# Patient Record
Sex: MALE | Race: WHITE | NOT HISPANIC OR LATINO | Employment: OTHER | URBAN - METROPOLITAN AREA
[De-identification: names, ages, dates, MRNs, and addresses within clinical notes are randomized per-mention and may not be internally consistent; named-entity substitution may affect disease eponyms.]

---

## 2017-03-03 ENCOUNTER — ALLSCRIPTS OFFICE VISIT (OUTPATIENT)
Dept: OTHER | Facility: OTHER | Age: 57
End: 2017-03-03

## 2017-03-30 ENCOUNTER — ALLSCRIPTS OFFICE VISIT (OUTPATIENT)
Dept: OTHER | Facility: OTHER | Age: 57
End: 2017-03-30

## 2017-03-30 DIAGNOSIS — K40.90 UNILATERAL INGUINAL HERNIA WITHOUT OBSTRUCTION OR GANGRENE: ICD-10-CM

## 2017-03-31 ENCOUNTER — HOSPITAL ENCOUNTER (OUTPATIENT)
Dept: RADIOLOGY | Facility: HOSPITAL | Age: 57
Discharge: HOME | End: 2017-03-31
Attending: SURGERY
Payer: COMMERCIAL

## 2017-03-31 DIAGNOSIS — K40.90 UNILATERAL INGUINAL HERNIA WITHOUT OBSTRUCTION OR GANGRENE: ICD-10-CM

## 2017-03-31 PROCEDURE — 76705 ECHO EXAM OF ABDOMEN: CPT

## 2017-04-11 ENCOUNTER — HOSPITAL ENCOUNTER (OUTPATIENT)
Dept: RADIOLOGY | Age: 57
Discharge: HOME | End: 2017-04-11
Payer: COMMERCIAL

## 2017-04-11 ENCOUNTER — APPOINTMENT (OUTPATIENT)
Dept: LAB | Age: 57
End: 2017-04-11
Payer: COMMERCIAL

## 2017-04-11 ENCOUNTER — TRANSCRIBE ORDERS (OUTPATIENT)
Dept: ADMINISTRATIVE | Age: 57
End: 2017-04-11

## 2017-04-11 DIAGNOSIS — N40.0 BENIGN PROSTATIC HYPERPLASIA, PRESENCE OF LOWER URINARY TRACT SYMPTOMS UNSPECIFIED, UNSPECIFIED MORPHOLOGY: ICD-10-CM

## 2017-04-11 DIAGNOSIS — E78.5 OTHER AND UNSPECIFIED HYPERLIPIDEMIA: ICD-10-CM

## 2017-04-11 DIAGNOSIS — I25.10 ATHEROSCLEROSIS OF NATIVE CORONARY ARTERY, ANGINA PRESENCE UNSPECIFIED, UNSPECIFIED WHETHER NATIVE OR TRANSPLANTED HEART: ICD-10-CM

## 2017-04-11 DIAGNOSIS — R52 PAIN: ICD-10-CM

## 2017-04-11 DIAGNOSIS — I25.10 ATHEROSCLEROSIS OF NATIVE CORONARY ARTERY, ANGINA PRESENCE UNSPECIFIED, UNSPECIFIED WHETHER NATIVE OR TRANSPLANTED HEART: Primary | ICD-10-CM

## 2017-04-11 LAB
ALBUMIN SERPL BCP-MCNC: 3.4 G/DL (ref 3.5–5)
ALP SERPL-CCNC: 125 U/L (ref 46–116)
ALT SERPL W P-5'-P-CCNC: 29 U/L (ref 12–78)
ANION GAP SERPL CALCULATED.3IONS-SCNC: 7 MMOL/L (ref 4–13)
AST SERPL W P-5'-P-CCNC: 19 U/L (ref 5–45)
BACTERIA UR QL AUTO: NORMAL /HPF
BASOPHILS # BLD AUTO: 0.04 THOUSANDS/ΜL (ref 0–0.1)
BASOPHILS NFR BLD AUTO: 1 % (ref 0–1)
BILIRUB SERPL-MCNC: 0.86 MG/DL (ref 0.2–1)
BILIRUB UR QL STRIP: NEGATIVE
BUN SERPL-MCNC: 16 MG/DL (ref 5–25)
CALCIUM SERPL-MCNC: 9.2 MG/DL (ref 8.3–10.1)
CHLORIDE SERPL-SCNC: 105 MMOL/L (ref 100–108)
CHOLEST SERPL-MCNC: 135 MG/DL (ref 50–200)
CLARITY UR: NORMAL
CO2 SERPL-SCNC: 29 MMOL/L (ref 21–32)
COLOR UR: YELLOW
CREAT SERPL-MCNC: 0.97 MG/DL (ref 0.6–1.3)
EOSINOPHIL # BLD AUTO: 0.52 THOUSAND/ΜL (ref 0–0.61)
EOSINOPHIL NFR BLD AUTO: 6 % (ref 0–6)
ERYTHROCYTE [DISTWIDTH] IN BLOOD BY AUTOMATED COUNT: 14.8 % (ref 11.6–15.1)
GFR SERPL CREATININE-BSD FRML MDRD: >60 ML/MIN/1.73SQ M
GLUCOSE P FAST SERPL-MCNC: 109 MG/DL (ref 65–99)
GLUCOSE UR STRIP-MCNC: NEGATIVE MG/DL
HCT VFR BLD AUTO: 42.8 % (ref 36.5–49.3)
HDLC SERPL-MCNC: 40 MG/DL (ref 40–60)
HGB BLD-MCNC: 14.4 G/DL (ref 12–17)
HGB UR QL STRIP.AUTO: NEGATIVE
HYALINE CASTS #/AREA URNS LPF: NORMAL /LPF
KETONES UR STRIP-MCNC: NEGATIVE MG/DL
LDLC SERPL CALC-MCNC: 66 MG/DL (ref 0–100)
LDLC SERPL DIRECT ASSAY-MCNC: 76 MG/DL (ref 0–100)
LEUKOCYTE ESTERASE UR QL STRIP: NEGATIVE
LYMPHOCYTES # BLD AUTO: 1.78 THOUSANDS/ΜL (ref 0.6–4.47)
LYMPHOCYTES NFR BLD AUTO: 21 % (ref 14–44)
MCH RBC QN AUTO: 33 PG (ref 26.8–34.3)
MCHC RBC AUTO-ENTMCNC: 33.6 G/DL (ref 31.4–37.4)
MCV RBC AUTO: 98 FL (ref 82–98)
MONOCYTES # BLD AUTO: 1.15 THOUSAND/ΜL (ref 0.17–1.22)
MONOCYTES NFR BLD AUTO: 14 % (ref 4–12)
NEUTROPHILS # BLD AUTO: 4.93 THOUSANDS/ΜL (ref 1.85–7.62)
NEUTS SEG NFR BLD AUTO: 58 % (ref 43–75)
NITRITE UR QL STRIP: NEGATIVE
NON-SQ EPI CELLS URNS QL MICRO: NORMAL /HPF
NRBC BLD AUTO-RTO: 0 /100 WBCS
PH UR STRIP.AUTO: 6 [PH] (ref 4.5–8)
PLATELET # BLD AUTO: 227 THOUSANDS/UL (ref 149–390)
PMV BLD AUTO: 11.8 FL (ref 8.9–12.7)
POTASSIUM SERPL-SCNC: 4.1 MMOL/L (ref 3.5–5.3)
PROT SERPL-MCNC: 7.5 G/DL (ref 6.4–8.2)
PROT UR STRIP-MCNC: NEGATIVE MG/DL
PSA SERPL-MCNC: 1.5 NG/ML (ref 0–4)
RBC # BLD AUTO: 4.36 MILLION/UL (ref 3.88–5.62)
RBC #/AREA URNS AUTO: NORMAL /HPF
SODIUM SERPL-SCNC: 141 MMOL/L (ref 136–145)
SP GR UR STRIP.AUTO: 1.01 (ref 1–1.03)
TRIGL SERPL-MCNC: 146 MG/DL
UROBILINOGEN UR QL STRIP.AUTO: 0.2 E.U./DL
WBC # BLD AUTO: 8.45 THOUSAND/UL (ref 4.31–10.16)
WBC #/AREA URNS AUTO: NORMAL /HPF

## 2017-04-11 PROCEDURE — 85025 COMPLETE CBC W/AUTO DIFF WBC: CPT

## 2017-04-11 PROCEDURE — 81001 URINALYSIS AUTO W/SCOPE: CPT | Performed by: INTERNAL MEDICINE

## 2017-04-11 PROCEDURE — 73502 X-RAY EXAM HIP UNI 2-3 VIEWS: CPT

## 2017-04-11 PROCEDURE — 80053 COMPREHEN METABOLIC PANEL: CPT

## 2017-04-11 PROCEDURE — G0103 PSA SCREENING: HCPCS

## 2017-04-11 PROCEDURE — 80061 LIPID PANEL: CPT

## 2017-04-11 PROCEDURE — 83721 ASSAY OF BLOOD LIPOPROTEIN: CPT

## 2017-04-11 PROCEDURE — 36415 COLL VENOUS BLD VENIPUNCTURE: CPT

## 2017-04-13 ENCOUNTER — ALLSCRIPTS OFFICE VISIT (OUTPATIENT)
Dept: OTHER | Facility: OTHER | Age: 57
End: 2017-04-13

## 2017-07-21 ENCOUNTER — ALLSCRIPTS OFFICE VISIT (OUTPATIENT)
Dept: OTHER | Facility: OTHER | Age: 57
End: 2017-07-21

## 2017-11-22 ENCOUNTER — APPOINTMENT (OUTPATIENT)
Dept: LAB | Age: 57
End: 2017-11-22
Payer: COMMERCIAL

## 2017-11-22 ENCOUNTER — TRANSCRIBE ORDERS (OUTPATIENT)
Dept: ADMINISTRATIVE | Age: 57
End: 2017-11-22

## 2017-11-22 DIAGNOSIS — E11.9 TYPE 2 DIABETES MELLITUS WITHOUT COMPLICATIONS (HCC): ICD-10-CM

## 2017-11-22 DIAGNOSIS — Z12.5 ENCOUNTER FOR SCREENING FOR MALIGNANT NEOPLASM OF PROSTATE: ICD-10-CM

## 2017-11-22 LAB
ALBUMIN SERPL BCP-MCNC: 3.5 G/DL (ref 3.5–5)
ALP SERPL-CCNC: 135 U/L (ref 46–116)
ALT SERPL W P-5'-P-CCNC: 27 U/L (ref 12–78)
ANION GAP SERPL CALCULATED.3IONS-SCNC: 5 MMOL/L (ref 4–13)
AST SERPL W P-5'-P-CCNC: 19 U/L (ref 5–45)
BILIRUB SERPL-MCNC: 0.96 MG/DL (ref 0.2–1)
BUN SERPL-MCNC: 20 MG/DL (ref 5–25)
CALCIUM SERPL-MCNC: 9.1 MG/DL (ref 8.3–10.1)
CHLORIDE SERPL-SCNC: 104 MMOL/L (ref 100–108)
CHOLEST SERPL-MCNC: 96 MG/DL (ref 50–200)
CO2 SERPL-SCNC: 26 MMOL/L (ref 21–32)
CREAT SERPL-MCNC: 0.95 MG/DL (ref 0.6–1.3)
ERYTHROCYTE [DISTWIDTH] IN BLOOD BY AUTOMATED COUNT: 13.4 % (ref 11.6–15.1)
EST. AVERAGE GLUCOSE BLD GHB EST-MCNC: 126 MG/DL
GFR SERPL CREATININE-BSD FRML MDRD: 87 ML/MIN/1.73SQ M
GLUCOSE P FAST SERPL-MCNC: 102 MG/DL (ref 65–99)
HBA1C MFR BLD: 6 % (ref 4.2–6.3)
HCT VFR BLD AUTO: 42.2 % (ref 36.5–49.3)
HDLC SERPL-MCNC: 36 MG/DL (ref 40–60)
HGB BLD-MCNC: 14.3 G/DL (ref 12–17)
LDLC SERPL CALC-MCNC: 36 MG/DL (ref 0–100)
MCH RBC QN AUTO: 32.6 PG (ref 26.8–34.3)
MCHC RBC AUTO-ENTMCNC: 33.9 G/DL (ref 31.4–37.4)
MCV RBC AUTO: 96 FL (ref 82–98)
PLATELET # BLD AUTO: 250 THOUSANDS/UL (ref 149–390)
PMV BLD AUTO: 11.3 FL (ref 8.9–12.7)
POTASSIUM SERPL-SCNC: 4.5 MMOL/L (ref 3.5–5.3)
PROT SERPL-MCNC: 7.6 G/DL (ref 6.4–8.2)
PSA SERPL-MCNC: 1.3 NG/ML (ref 0–4)
RBC # BLD AUTO: 4.38 MILLION/UL (ref 3.88–5.62)
SODIUM SERPL-SCNC: 135 MMOL/L (ref 136–145)
TRIGL SERPL-MCNC: 119 MG/DL
TSH SERPL DL<=0.05 MIU/L-ACNC: 1.64 UIU/ML (ref 0.36–3.74)
WBC # BLD AUTO: 8.17 THOUSAND/UL (ref 4.31–10.16)

## 2017-11-22 PROCEDURE — 83036 HEMOGLOBIN GLYCOSYLATED A1C: CPT

## 2017-11-22 PROCEDURE — G0103 PSA SCREENING: HCPCS

## 2017-11-22 PROCEDURE — 80061 LIPID PANEL: CPT

## 2017-11-22 PROCEDURE — 85027 COMPLETE CBC AUTOMATED: CPT

## 2017-11-22 PROCEDURE — 84443 ASSAY THYROID STIM HORMONE: CPT

## 2017-11-22 PROCEDURE — 80053 COMPREHEN METABOLIC PANEL: CPT

## 2017-11-22 PROCEDURE — 36415 COLL VENOUS BLD VENIPUNCTURE: CPT

## 2017-11-23 DIAGNOSIS — Z12.5 ENCOUNTER FOR SCREENING FOR MALIGNANT NEOPLASM OF PROSTATE: ICD-10-CM

## 2017-11-23 DIAGNOSIS — E11.9 TYPE 2 DIABETES MELLITUS WITHOUT COMPLICATIONS (HCC): ICD-10-CM

## 2017-12-15 ENCOUNTER — ALLSCRIPTS OFFICE VISIT (OUTPATIENT)
Dept: OTHER | Facility: OTHER | Age: 57
End: 2017-12-15

## 2017-12-16 NOTE — PROGRESS NOTES
Assessment  Assessed    1  2-vessel coronary artery disease (414 00) (I25 10)   2  Hyperlipidemia (272 4) (E78 5)   3  Hypertension (401 9) (I10)   4  Stroke syndrome (434 91) (I63 9)    Plan  Hypertension, Unstable angina    · EKG/ECG- POC; Status:Complete;   Done: 57AHA4921   Perform: In Office; Due:93Gbg6055; Last Updated By:Loren Fleming; 12/15/2017 9:03:33 AM;Ordered;For:Hypertension, Unstable angina; Ordered By:LAZARA Rogers Jr;  Stroke syndrome    · EKG/ECG- POC; Status:Active - Perform Order; Requested for:27Xcy0310; Perform: In Office; Due:69Aai4578; Last Updated Rosa M Osborn; 12/15/2017 9:29:07 AM;Ordered; For:Stroke syndrome; Ordered By:LAZARA Rogers Jr;   · Follow-up Visit in 8 Months Evaluation and Treatment  Follow-up  Status: Complete Done: 39ZPD7333   Ordered; For: Stroke syndrome; Ordered By: LAZARA Haq Performed:  Due: 90TYA6758; Last Updated By: Craige Dec; 12/15/2017 9:29:07 AM   · Noelle Hines MD, James Todd  (Orthopedic Surgery) Co-Management  *  Status: Active Requested for: 56TPZ6356   Ordered; For: Stroke syndrome; Ordered By: LAZARA Haq Performed:  Due: 06FKC4098; Last Updated By: Craige Dec; 12/15/2017 9:29:07 AM  Care Summary provided  : Yes    Discussion/Summary  Cardiology Discussion Summary Free Text Note Form [facility]:   1  Rt groin pain, may be arthritic, asked to get ortho evaluation; intent on obtaining disability2  Tobacco, none using nicotine gum3  Lipids, good with therapy4  BP, normal5  CAD, no symptoms  Goals and Barriers: The patent has the current Barriers: Limited by prior strokes, comprehension limited  Patient's Capacity to Self-Care: Patient is able to Self-Care  Chief Complaint  Chief Complaint Free Text Note Form: 5 month f/u with an EKG - Adalazara Angie is requesting the completion of his disability forms  He denies chest pain, SOB or swelling in LE, no palpitations        History of Present Illness  HPI: Early 4 month F/U of CAD, with prior stroke, difficult for him to understand that for disability, stable medical problems; C/O Rt groin pain that may be arthritic, made referralPMH/CAD, anterior MI, LAD stent 1998, repeat acute anterior MI 1999, resenting of the LAD  Unstable, 2000, large RT coronary subtotal occlusion, 4 5 mm stent Testing 2012, Echo 50%, anteroapical hypokinesis, no MR, SPECT EF 49%, all scar, minor ischemia  HIGH risk, stay on DAPCryptogenic strokes 2012, left middle cerebral artery strokes, initial ischemic subsequent hemorrhagic conversion in 2012  Speech therapy, with marked improved  Probably not hypercoagulable  Tobacco, no smoking using gumHypertension good BP controlHyperlipidemia, compliantImpaired fasting glucose, mild      Review of Systems  Cardiology Male ROS:    Cardiac: No complaints of chest pain, no palpitations, no fainiting ,-- no chest pain,-- no fainting/blackouts,-- no signs of swelling-- and-- no syncope/fainting  Genitourinary: no kidney problems  General: no changes in weight-- and-- no lack of energy/fatigue  Respiratory: no shortness of breath-- and-- no cough/sputum  Gastrointestinal: no abdonimal pain  Hematologic: no anemia-- and-- no excessive bruising  Neurological: no weakness-- and-- no dizziness  Musculoskeletal: arthritis-- Rt hip  Active Problems  Problems    1  2-vessel coronary artery disease (414 00) (I25 10)   2  Abnormal liver function test (790 6) (R79 89)   3  Hyperlipidemia (272 4) (E78 5)   4  Hypertension (401 9) (I10)   5  Prostate cancer screening information given during patient encounter (V76 44) (Z12 5)   6  Right groin hernia (550 90) (K40 90)   7  Stroke syndrome (434 91) (I63 9)   8  Type 2 diabetes mellitus (250 00) (E11 9)   9  Unstable angina (411 1) (I20 0)    Past Medical History  Problems    1  History of Acute Myocardial Infarction Of The Anterior Wall (410 10)   2  History of Ischemic Stroke (434 91)    Surgical History  Problems    1   History of Cath Stent Placement Number Of Stents Placed:    Family History  Father    1  Family history of Transient Ischemic Attack   2  Family history of Type 2 Diabetes Mellitus    Social History  Problems    · Denied: History of Alcohol Use (History)   · Former smoker (Q21 38) (N24 644)   · Full-time employment   ·     Current Meds   1  Aspirin 325 MG Oral Tablet; TAKE 1 TABLET DAILY; Therapy: (Recorded:16Tqv0305) to Recorded   2  Atorvastatin Calcium 80 MG Oral Tablet; Take 1 tablet daily; Therapy: 03CQR5670 to (Evaluate:56Bhf3961)  Requested for: 93YXA6688 Recorded   3  Clopidogrel Bisulfate 75 MG Oral Tablet; Take 1 tablet daily; Therapy: 73QZL4693 to (Last Rx:65Zya1267)  Requested for: 06Fas0360 Ordered   4  Coreg CR 80 MG Oral Capsule Extended Release 24 Hour; TAKE 1 CAPSULE EVERY MORNING WITH FOOD; Therapy: ([phone]) to  Requested for: 84RFR7807 Recorded   5  Ramipril 10 MG Oral Capsule; TAKE 1 CAPSULE DAILY; Therapy: (0688 [id number] 49 99) to  Requested for: 65VFK5617 Recorded    Allergies  Medication    1  Penicillins    Vitals  Vital Signs    Recorded: 46KOV4160 08:58AM   Heart Rate 61, Apical   Systolic 738, LUE   Diastolic 70, LUE   Height 6 ft    Weight 189 lb    BMI Calculated 25 63   BSA Calculated 2 08     Physical Exam   Constitutional  General appearance: No acute distress, well appearing and well nourished  -- stable mild dysarthria  Neck  Neck and thyroid: Normal, supple, trachea midline, no thyromegaly  -- JVP NL, carotid, thyroid normal   Pulmonary  Respiratory effort: No increased work of breathing or signs of respiratory distress  Auscultation of lungs: Clear to auscultation, no rales, no rhonchi, no wheezing, good air movement  Cardiovascular  Auscultation of heart: Normal rate and rhythm, normal S1 and S2, no murmurs  -- no S4, RSR  Pedal pulses: Normal, 2+ bilaterally     Examination of extremities for edema and/or varicosities: Normal    Chest - Chest: Normal   Abdomen  Abdomen: Non-tender and no distention  Liver and spleen: No hepatomegaly or splenomegaly  Musculoskeletal Gait and station: Normal gait  Skin - Skin and subcutaneous tissue: Normal without rashes or lesions  Skin is warm and well perfused, normal turgor  -- no bruising  Psychiatric - Orientation to person, place, and time: Normal -- Mood and affect: Normal       Signatures   Electronically signed by : ALEXA Bingham ; Dec 15 2017  9:34AM EST                       (Author)

## 2018-01-13 VITALS
SYSTOLIC BLOOD PRESSURE: 120 MMHG | WEIGHT: 200.38 LBS | DIASTOLIC BLOOD PRESSURE: 80 MMHG | BODY MASS INDEX: 27.14 KG/M2 | HEIGHT: 72 IN

## 2018-01-14 VITALS
WEIGHT: 197 LBS | SYSTOLIC BLOOD PRESSURE: 140 MMHG | HEART RATE: 60 BPM | DIASTOLIC BLOOD PRESSURE: 80 MMHG | BODY MASS INDEX: 26.68 KG/M2 | HEIGHT: 72 IN

## 2018-01-14 VITALS
DIASTOLIC BLOOD PRESSURE: 68 MMHG | BODY MASS INDEX: 27.11 KG/M2 | TEMPERATURE: 97.7 F | RESPIRATION RATE: 12 BRPM | HEART RATE: 64 BPM | HEIGHT: 72 IN | WEIGHT: 200.13 LBS | SYSTOLIC BLOOD PRESSURE: 100 MMHG

## 2018-01-14 VITALS
BODY MASS INDEX: 26.9 KG/M2 | HEART RATE: 54 BPM | SYSTOLIC BLOOD PRESSURE: 148 MMHG | HEIGHT: 73 IN | DIASTOLIC BLOOD PRESSURE: 80 MMHG | WEIGHT: 203 LBS

## 2018-01-18 ENCOUNTER — ALLSCRIPTS OFFICE VISIT (OUTPATIENT)
Dept: OTHER | Facility: OTHER | Age: 58
End: 2018-01-18

## 2018-01-18 DIAGNOSIS — M25.551 PAIN IN RIGHT HIP: ICD-10-CM

## 2018-01-19 NOTE — PROGRESS NOTES
Assessment   1  Right hip pain (474 76) (M25 554)    Plan    Right hip pain    · *1 - [facility] Co-Management  *  Status: Active - Retrospective Authorization     Requested for: 03JID0424  Care Summary provided  : Yes   · FL INJECTION RIGHT HIP (STEROIDS); Status:Active - Retrospective Authorization; Requested GNI:[id number]; Follow-up visit in 6 weeks Evaluation and Treatment  Follow-up  Status: Complete - Retrospective Authorization  Done: 18VCB7988     Ordered; For: Right hip pain;  Ordered By: Rock Appiah  Performed:   Due: 64QTR2762; Last Updated By: Wiley Soto; 1/18/2018 9:33:38 AM       Discussion/Summary      Patient will get a cortisone injection, start physical therapy and oral medications for arthritis  Patient was instructed to keep detailed notes on the effects of the injection  Patient will be back in 6 weeks  If current treatment plan is ineffective, we will discuss options for a YOSELIN  Chief Complaint   L hip pain      History of Present Illness   HPI: 59year old male presents to the office for R hip pain with a 2 year gradual onset  He feels his pain in the groin with activity  He has not tried any oral medications  Review of Systems        Constitutional: No fever or chills, feels well, no tiredness, no recent weight loss or weight gain  Eyes: No complaints of red eyes, no eyesight problems  ENT: no complaints of loss of hearing, no nosebleeds, no sore throat  Cardiovascular: No complaints of chest pain, no palpitations, no leg claudication or lower extremity edema  Respiratory: No complaints of shortness of breath, no wheezing, no cough  Gastrointestinal: No complaints of abdominal pain, no constipation, no nausea or vomiting, no diarrhea or bloody stools  Genitourinary: No complaints of dysuria or incontinence, no hesitancy, no nocturia  Musculoskeletal: as noted in HPI        Integumentary: No complaints of skin rash or lesion, no itching or dry skin, no skin wounds  Neurological: No complaints of headache, no confusion, no numbness or tingling, no dizziness  Psychiatric: No suicidal thoughts, no anxiety, no depression  Endocrine: No muscle weakness, no frequent urination, no excessive thirst, no feelings of weakness  ROS reviewed  Active Problems   1  2-vessel coronary artery disease (414 00) (I25 10)   2  Abnormal liver function test (790 6) (R79 89)   3  Hyperlipidemia (272 4) (E78 5)   4  Hypertension (401 9) (I10)   5  Prostate cancer screening information given during patient encounter (V76 44) (Z12 5)   6  Right groin hernia (550 90) (K40 90)   7  Stroke syndrome (434 91) (I63 9)   8  Type 2 diabetes mellitus (250 00) (E11 9)   9  Unstable angina (411 1) (I20 0)    Past Medical History    · History of Acute Myocardial Infarction Of The Anterior Wall (410 10)   · History of Ischemic Stroke (434 91)     The active problems and past medical history were reviewed and updated today  Surgical History    · History of Cath Stent Placement Number Of Stents Placed: The surgical history was reviewed and updated today  Family History   Father    · Family history of Transient Ischemic Attack   · Family history of Type 2 Diabetes Mellitus     The family history was reviewed and updated today  Social History    · Denied: History of Alcohol Use (History)   · Former smoker (V15 82) (J23 344)   · Full-time employment   ·   The social history was reviewed and updated today  The social history was reviewed and is unchanged  Current Meds    1  Aspirin 325 MG Oral Tablet; TAKE 1 TABLET DAILY; Therapy: (Recorded:72Mlz6368) to Recorded   2  Atorvastatin Calcium 80 MG Oral Tablet; Take 1 tablet daily; Therapy: 28ZDZ8973 to (Evaluate:60Svi5347)  Requested for: 45QHD4571 Recorded   3  Clopidogrel Bisulfate 75 MG Oral Tablet; Take 1 tablet daily;      Therapy: 75Wdk7168 to (Last Rx: 15TWJ7133)  Requested for: 52Pit6986 Ordered   4  Coreg CR 80 MG Oral Capsule Extended Release 24 Hour; TAKE 1 CAPSULE EVERY     MORNING WITH FOOD; Therapy: (Lee Ann Flanagan) to  Requested for: 85ACU5749 Recorded   5  Ramipril 10 MG Oral Capsule; TAKE 1 CAPSULE DAILY; Therapy: (Lee Ann Masha) to  Requested for: 04OJR9906 Recorded     The medication list was reviewed and updated today  Allergies   1  Penicillins    Vitals   Signs   Heart Rate: 71  Systolic: 445  Diastolic: 72  Height: 6 ft   Weight: 200 lb   BMI Calculated: 27 13  BSA Calculated: 2 13    Physical Exam   Internal rotation 5 degrees and painful R hip  External rotation is 25 degrees and painful  Flexion is 120 degrees  Straight leg raise positive right side  Therapy   Rehabilitation Services Referral:      Patient Status: acute  Diagnosis: Osteoartritis of the right hip  Rehabilitation Services: evaluate and treat patient as needed  Frequency: 3 times per week, for 4 weeks  Please send progress report  I hereby certify that the services indicated above are medically necessary  Signatures    Electronically signed by :  ALEXA Cheng ; Jan 18 2018  2:29PM EST                       (Author)

## 2018-01-23 ENCOUNTER — HOSPITAL ENCOUNTER (OUTPATIENT)
Dept: RADIOLOGY | Facility: HOSPITAL | Age: 58
Discharge: HOME | End: 2018-01-23
Attending: ORTHOPAEDIC SURGERY
Payer: COMMERCIAL

## 2018-01-23 VITALS
SYSTOLIC BLOOD PRESSURE: 118 MMHG | HEIGHT: 72 IN | HEART RATE: 61 BPM | BODY MASS INDEX: 25.6 KG/M2 | WEIGHT: 189 LBS | DIASTOLIC BLOOD PRESSURE: 70 MMHG

## 2018-01-23 VITALS
WEIGHT: 200 LBS | DIASTOLIC BLOOD PRESSURE: 72 MMHG | HEART RATE: 71 BPM | HEIGHT: 72 IN | SYSTOLIC BLOOD PRESSURE: 114 MMHG | BODY MASS INDEX: 27.09 KG/M2

## 2018-01-23 DIAGNOSIS — M25.551 PAIN IN RIGHT HIP: ICD-10-CM

## 2018-01-23 PROCEDURE — 20610 DRAIN/INJ JOINT/BURSA W/O US: CPT

## 2018-01-23 PROCEDURE — 77002 NEEDLE LOCALIZATION BY XRAY: CPT

## 2018-01-23 RX ORDER — METHYLPREDNISOLONE ACETATE 80 MG/ML
80 INJECTION, SUSPENSION INTRA-ARTICULAR; INTRALESIONAL; INTRAMUSCULAR; SOFT TISSUE
Status: COMPLETED | OUTPATIENT
Start: 2018-01-23 | End: 2018-01-23

## 2018-01-23 RX ORDER — BUPIVACAINE HYDROCHLORIDE 2.5 MG/ML
30 INJECTION, SOLUTION EPIDURAL; INFILTRATION; INTRACAUDAL
Status: COMPLETED | OUTPATIENT
Start: 2018-01-23 | End: 2018-01-23

## 2018-01-23 RX ORDER — LIDOCAINE HYDROCHLORIDE 10 MG/ML
20 INJECTION, SOLUTION INFILTRATION; PERINEURAL
Status: COMPLETED | OUTPATIENT
Start: 2018-01-23 | End: 2018-01-23

## 2018-01-23 RX ADMIN — METHYLPREDNISOLONE ACETATE 80 MG: 80 INJECTION, SUSPENSION INTRA-ARTICULAR; INTRALESIONAL; INTRAMUSCULAR; SOFT TISSUE at 14:41

## 2018-01-23 RX ADMIN — BUPIVACAINE HYDROCHLORIDE 20 ML: 2.5 INJECTION, SOLUTION EPIDURAL; INFILTRATION; INTRACAUDAL at 14:41

## 2018-01-23 RX ADMIN — IOHEXOL 3 ML: 300 INJECTION, SOLUTION INTRAVENOUS at 14:30

## 2018-01-23 RX ADMIN — LIDOCAINE HYDROCHLORIDE 20 ML: 10 INJECTION, SOLUTION INFILTRATION; PERINEURAL at 14:41

## 2018-01-23 NOTE — CONSULTS
Therapy  Rehabilitation Services Referral:   Patient Status: acute  Diagnosis: Osteoartritis of the right hip  Rehabilitation Services: evaluate and treat patient as needed  Frequency: 3 times per week, for 4 weeks  Please send progress report  I hereby certify that the services indicated above are medically necessary  Signatures   Electronically signed by :  ALEXA Lozoya ; Jan 18 2018  2:29PM EST                       (Author)

## 2018-01-26 ENCOUNTER — EVALUATION (OUTPATIENT)
Dept: PHYSICAL THERAPY | Facility: OTHER | Age: 58
End: 2018-01-26
Payer: COMMERCIAL

## 2018-01-26 DIAGNOSIS — M25.551 RIGHT HIP PAIN: Primary | ICD-10-CM

## 2018-01-26 DIAGNOSIS — M25.551 PAIN IN RIGHT HIP: ICD-10-CM

## 2018-01-26 PROCEDURE — 97162 PT EVAL MOD COMPLEX 30 MIN: CPT | Performed by: PHYSICAL THERAPIST

## 2018-01-26 PROCEDURE — 97110 THERAPEUTIC EXERCISES: CPT | Performed by: PHYSICAL THERAPIST

## 2018-01-26 PROCEDURE — G8979 MOBILITY GOAL STATUS: HCPCS | Performed by: PHYSICAL THERAPIST

## 2018-01-26 PROCEDURE — G8978 MOBILITY CURRENT STATUS: HCPCS | Performed by: PHYSICAL THERAPIST

## 2018-01-26 NOTE — PROGRESS NOTES
PT Evaluation     Today's date: 2018  Patient name: Charu Murillo  : 1960  MRN: [id number]  Referring provider: Edel Davila MD  Dx:   Encounter Diagnoses   Name Primary?  Right hip pain Yes    Pain in right hip                   Assessment  Impairments: abnormal or restricted ROM, activity intolerance, impaired physical strength, lacks appropriate home exercise program and pain with function  Understanding of Dx/Px/POC: good   Prognosis: good    Goals  Short Term:  Pt will report decreased levels of pain by at least 2 subjective ratings in 4 weeks  Pt will demonstrate improved ROM by at least 10 degrees in 4 weeks  Pt will demonstrate improved strength by 1/2 grade  Long Term:   Pt will be independent in their HEP in 8 weeks  Pt will be be pain free with IADL's  Pt will demonstrate improved FOTO score   Patient's Goals:  "Patient reports he would like to be pain free "      Plan    Planned modality interventions: cryotherapy, thermotherapy: hydrocollator packs, TENS and low level laser therapy  Planned therapy interventions: balance, flexibility, functional ROM exercises, gait training, home exercise program, manual therapy, joint mobilization, neuromuscular re-education, patient education, strengthening, stretching, therapeutic activities, therapeutic exercise and therapeutic training  Frequency: 3x week  Duration in weeks: 4  Treatment plan discussed with: patient        Subjective Evaluation    History of Present Illness  Onset date: 2 years ago  Mechanism of injury: Patient reports his R hip began to bother him approximately 2 years ago  He said 10 months ago he reported straining his groin  Patient reports that his job required him to go up and down stairs  This repetitive motion caused him increased pain  He remembers one date in particular (10/12/2017)  He went on disability initially  Soon after he retired      Quality of life: good    Pain  Current pain ratin  At best pain ratin  At worst pain ratin  Location: Right Hip   Quality: dull ache  Relieving factors: heat and rest  Aggravating factors: walking and stair climbing      Diagnostic Tests  X-ray: abnormal  Treatments  No previous or current treatments  Current treatment: physical therapy  Patient Goals  Patient goals for therapy: decreased pain, improved balance, increased motion, increased strength and independence with ADLs/IADLs  Patient goal: "Patient reports he would like to be pain free "          Objective     Lumbar Screen  Lumbar range of motion within normal limits  Active Range of Motion   Left Hip   Flexion: 105 degrees   Extension: 15 degrees   Abduction: 30 degrees     Right Hip   Flexion: 100 degrees   Extension: 10 degrees   Abduction: 15 degrees     Passive Range of Motion   Left Hip   Flexion: 115 degrees   Extension: 15 degrees   Abduction: 30 degrees   External rotation (prone): 25 degrees   Internal rotation (prone): 0 degrees     Right Hip   Flexion: 110 degrees   Extension: 15 degrees   Abduction: 30 degrees   External rotation (prone): 20 degrees   Internal rotation (prone): 0 degrees     Strength/Myotome Testing     Left Hip   Planes of Motion   Flexion: 4+  Extension: 4  Abduction: 4-  Adduction: 5    Right Hip   Planes of Motion   Flexion: 4+  Extension: 4  Abduction: 5  Adduction: 5    Tests     Left Hip   Positive Ely's  Negative JAN, FADIR and scour  Right Hip   Positive Ely's, JAN, FADIR and scour       Additional Tests Details  Log Roll - but IR is limited bilaterally with this test      Precautions: history of stroke, history of heart attack, hard of hearing    Daily Treatment Diary     Manual              Gentle PA mobs                                                                     Exercise Diary              SLR extension              SLR abduction             Quad strap stretch             Hamstring strap stretch             Slantboard              Rockerboard- Balance HR/TR             Clamshells              Monster Walks             Mini Squats                                                                                                                                                   Modalities              laser

## 2018-01-29 ENCOUNTER — OFFICE VISIT (OUTPATIENT)
Dept: PHYSICAL THERAPY | Facility: OTHER | Age: 58
End: 2018-01-29
Payer: COMMERCIAL

## 2018-01-29 DIAGNOSIS — M25.551 PAIN IN RIGHT HIP: ICD-10-CM

## 2018-01-29 DIAGNOSIS — M25.551 RIGHT HIP PAIN: Primary | ICD-10-CM

## 2018-01-29 PROCEDURE — 97140 MANUAL THERAPY 1/> REGIONS: CPT

## 2018-01-29 PROCEDURE — 97112 NEUROMUSCULAR REEDUCATION: CPT

## 2018-01-29 PROCEDURE — 97110 THERAPEUTIC EXERCISES: CPT

## 2018-01-29 NOTE — PROGRESS NOTES
Daily Note     Today's date: 2018  Patient name: Sin Everett  : 1960  MRN: [id number]  Referring provider: Keely Miranda MD  Dx:   Encounter Diagnoses   Name Primary?  Right hip pain Yes    Pain in right hip                   Subjective: Patient reports feeling "pretty good" after the IE and reports 2/10 pain in hip currently today  Patient reported anterior hip pain today  Objective: See treatment diary below  Manual                        Gentle PA mobs                        prone hip IR/ER ROM;quad stretch  10'                                                                                                   Exercise Diary                        SLR extension   1x15                     SLR abduction  1x15                     Quad strap stretch  10"x10                     Hamstring strap stretch  10"x10                     Slantboard                        Rockerboard- Balance                       HR/TR                       ClaRadialogicater Walks                       Mini Squats                        Bike 10'                      bridges 3"x10                                                                                                                                                                                                                           Modalities                        laser                                                                                   Assessment: Tolerated treatment well  Patient could benefit from continued PT  Patient appeared to tolerate AROM well, however IR PROM was very limited  Perform mobs nv if PT available  Plan: Progress treatment as tolerated

## 2018-01-30 ENCOUNTER — OFFICE VISIT (OUTPATIENT)
Dept: PHYSICAL THERAPY | Facility: OTHER | Age: 58
End: 2018-01-30
Payer: COMMERCIAL

## 2018-01-30 DIAGNOSIS — M25.551 RIGHT HIP PAIN: Primary | ICD-10-CM

## 2018-01-30 DIAGNOSIS — M25.551 PAIN IN RIGHT HIP: ICD-10-CM

## 2018-01-30 PROCEDURE — 97110 THERAPEUTIC EXERCISES: CPT

## 2018-01-30 PROCEDURE — 97140 MANUAL THERAPY 1/> REGIONS: CPT

## 2018-01-30 PROCEDURE — 97112 NEUROMUSCULAR REEDUCATION: CPT

## 2018-01-30 NOTE — PROGRESS NOTES
Daily Note     Today's date: 2018  Patient name: Mateo Buck  : 1960  MRN: [id number]  Referring provider: Inder Rivera MD  Dx:   Encounter Diagnoses   Name Primary?  Right hip pain Yes    Pain in right hip                   Subjective: Patient reported he is doing alright today  Stated the upright bike bothered him a little yesterday  Objective: See treatment diary below  Manual                      Gentle PA mobs    5' by [facility]                    prone hip IR/ER ROM;quad stretch  10'  5'                                                                                                 Exercise Diary                      SLR extension   1x15  1 x15                   SLR abduction  1x15  1 x15                   Quad strap stretch  10"x10  10"x10                   Hamstring strap stretch  10"x10  10" x10                   Slantboard     30" x4                   Rockerboard- Balance    A/P -M/L 1' ea                   HR/TR    20x                   Clamshells     15x                   Monster Walks                       Mini Squats                        Bike 10'  10'                    bridges 3"x10                      Hip add squeeze   5" x20                    Hip abd OTB   5" x20                                                                                                                                                                         Modalities                        laser                                                                             Assessment: Patient was able to progress TE this visit  No reports of increased pain  Cueing needed for s/l TE for proper form  Remains tight in hip IR/ER Tolerated treatment well  Patient would benefit from continued PT      Plan: Continue per plan of care

## 2018-02-01 ENCOUNTER — OFFICE VISIT (OUTPATIENT)
Dept: PHYSICAL THERAPY | Facility: OTHER | Age: 58
End: 2018-02-01
Payer: COMMERCIAL

## 2018-02-01 DIAGNOSIS — M25.551 PAIN IN RIGHT HIP: Primary | ICD-10-CM

## 2018-02-01 PROCEDURE — 97140 MANUAL THERAPY 1/> REGIONS: CPT

## 2018-02-01 PROCEDURE — 97112 NEUROMUSCULAR REEDUCATION: CPT

## 2018-02-01 PROCEDURE — 97110 THERAPEUTIC EXERCISES: CPT

## 2018-02-01 NOTE — PROGRESS NOTES
Daily Note     Today's date: 2018  Patient name: Nataly Duarte  : 1960  MRN: [id number]  Referring provider: Coty Marinelli MD  Dx:   Encounter Diagnosis   Name Primary?  Pain in right hip Yes     IEP 10:45-10:53, 1 on 1 10:53-11:40         Subjective:Patient denies pain the last two days, but reports tightness through his quad and hip flexor  Objective: See treatment diary below  Manual                    Gentle PA mobs    5' by [facility]OS[facility], DPT  5'                  prone hip IR/ER ROM;quad stretch  10'  5'  5'                                                                                               Exercise Diary                  SLR extension   1x15  1 x15  2x10               SLR abduction  1x15  1 x15  2x10               Quad strap stretch  10"x10  10"x10  10"x10               Hamstring strap stretch  10"x10  10" x10  10"x10               Slantboard     30" x4  10"x10               Rockerboard- Balance    A/P -M/L 1' ea                 HR/TR    20x                 Clamshells     15x  OTB 15x               Monster Walks      OTB x3               Mini Squats                     Recumbent Bike 10'  10'  10'                bridges 3"x10    3" 2x10               Hip add squeeze   5" x20  5"x20                Hip abd OTB   5" x20  np               SLR flexion       10                alter G 50%                                                                                                                   Modalities                        laser                                                                             Assessment: Mildly improved hip mobility  Mild increased R groin discomfort with addition of monster walks  Continues to demonstrate antalgic gait with walking Patient would benefit from continued PT      Plan: Continue per plan of care  Progress as able  Consider adding alter G in the future at a low percentage to normalize gait

## 2018-02-05 ENCOUNTER — APPOINTMENT (OUTPATIENT)
Dept: PHYSICAL THERAPY | Facility: OTHER | Age: 58
End: 2018-02-05
Payer: COMMERCIAL

## 2018-02-06 ENCOUNTER — OFFICE VISIT (OUTPATIENT)
Dept: PHYSICAL THERAPY | Facility: OTHER | Age: 58
End: 2018-02-06
Payer: COMMERCIAL

## 2018-02-06 DIAGNOSIS — M25.551 RIGHT HIP PAIN: ICD-10-CM

## 2018-02-06 DIAGNOSIS — M25.551 PAIN IN RIGHT HIP: Primary | ICD-10-CM

## 2018-02-06 PROCEDURE — 97110 THERAPEUTIC EXERCISES: CPT

## 2018-02-06 PROCEDURE — 97140 MANUAL THERAPY 1/> REGIONS: CPT

## 2018-02-06 PROCEDURE — 97112 NEUROMUSCULAR REEDUCATION: CPT

## 2018-02-06 PROCEDURE — 97116 GAIT TRAINING THERAPY: CPT

## 2018-02-06 NOTE — PROGRESS NOTES
Daily Note     Today's date: 2018  Patient name: Sin Everett  : 1960  MRN: [id number]  Referring provider: Keely Miranda MD  Dx:   Encounter Diagnoses   Name Primary?  Pain in right hip Yes    Right hip pain               Subjective: Reports feeling tight but denies pain  Continues with deviated gait  Objective: See treatment diary below  Manual     2/               Gentle PA mobs    5' by [facility], DPT  5'  5'- by PT [facility]                prone hip IR/ER ROM;quad stretch  10'  5'  5'  5'                                                                                             Exercise Diary                SLR extension B/L  1x15  1 x15  2x10  2x10             SLR abduction B/L  1x15  1 x15  2x10  2x10             Quad strap stretch  10"x10  10"x10  10"x10  10"x10             Hamstring strap stretch  10"x10  10" x10  10"x10  10"x10             Slantboard     30" x4  10"x10  10"x10             Rockerboard- Balance    A/P -M/L 1' ea    A-P &  M-L   1 min'              HR/TR    20x    20ea              Clamshells     15x  OTB 15x  OTBx20             Monster Walks      OTB x3  OTBx3             Mini Squats        NP             Recumbent Bike 10'  10'  10'  10'              bridges 3"x10    3" 2x10  3"x20             Hip add squeeze   5" x20  5"x20  5"x20              Hip abd OTB   5" x20  np  NP             SLR flexion  B/L      10  2x10              alter G 50%       8 mins @ 2 4 mph                                                                                                           Modalities           2/6             laser          NP                                                                   Assessment: Requires frequent cues for improved posture and TA recruitment  Gait training in [address] [place] [address],  [address] added to POC to help normalize gait pattern   Left lateral lean and decreased right stance time which patient was able to correct with VC's and use of monitor for visual feedback  Cues also provided to avoid toeing out  Patient would benefit from continued PT      Plan: Continue per plan of care  Progress as able

## 2018-02-09 ENCOUNTER — OFFICE VISIT (OUTPATIENT)
Dept: PHYSICAL THERAPY | Facility: OTHER | Age: 58
End: 2018-02-09
Payer: COMMERCIAL

## 2018-02-09 DIAGNOSIS — M25.551 PAIN IN RIGHT HIP: Primary | ICD-10-CM

## 2018-02-09 DIAGNOSIS — M25.551 RIGHT HIP PAIN: ICD-10-CM

## 2018-02-09 PROCEDURE — 97116 GAIT TRAINING THERAPY: CPT | Performed by: PEDIATRICS

## 2018-02-09 PROCEDURE — 97140 MANUAL THERAPY 1/> REGIONS: CPT | Performed by: PEDIATRICS

## 2018-02-09 PROCEDURE — 97110 THERAPEUTIC EXERCISES: CPT | Performed by: PEDIATRICS

## 2018-02-09 PROCEDURE — 97112 NEUROMUSCULAR REEDUCATION: CPT | Performed by: PEDIATRICS

## 2018-02-09 NOTE — PROGRESS NOTES
Daily Note     Today's date: 2018  Patient name: Karl Hernandez  : 1960  MRN: [id number]  Referring provider: Adrianne Guevara MD  Dx:   Encounter Diagnoses   Name Primary?  Pain in right hip Yes    Right hip pain            1:1 with PTA for duration of treatment session       Subjective: Pt  Denies pain at start of treatment session  Objective: See treatment diary below  Manual                Gentle PA mobs    5' by Elsi Carbajal, DPT  5'  5'- by PT Elsi Romp                prone hip IR/ER ROM;quad stretch  10'  5'  5'  5'                                                                                             Exercise Diary              SLR extension B/L  1x15  1 x15  2x10  2x10  np           SLR abduction B/L  1x15  1 x15  2x10  2x10  np           Quad strap stretch  10"x10  10"x10  10"x10  10"x10  np           Hamstring strap stretch  10"x10  10" x10  10"x10  10"x10  np           Slantboard     30" x4  10"x10  10"x10  10" x 10           Rockerboard- Balance    A/P -M/L 1' ea    A-P &  M-L   1 min'   AP/ML  1' ea           HR/TR    20x    20ea   20 ea           Clamshells     15x  OTB 15x  OTBx20  OTB  X 20           Monster Walks      OTB x3  OTBx3  OTB  3x           Mini Squats        NP  20x           Recumbent Bike 10'  10'  10'  10'  10'            bridges 3"x10    3" 2x10  3"x20  3" x 20           Hip add squeeze   5" x20  5"x20  5"x20  5" x20            Hip abd OTB   5" x20  np  NP  np           SLR flexion  B/L      10  2x10  2 x 10            alter G 50%       8 mins @ 2 4 mph  10 min                                                                                                         Modalities                      laser          NP  NP                                                                 Assessment: Tolerated treatment fair  Patient demonstrated fatigue post treatment and throughout treatment session   Pt  Requested to finish session early secondary to increased fatigue  Resume all exercises as able next visit  Plan: Continue per plan of care

## 2018-02-12 ENCOUNTER — OFFICE VISIT (OUTPATIENT)
Dept: PHYSICAL THERAPY | Facility: OTHER | Age: 58
End: 2018-02-12
Payer: COMMERCIAL

## 2018-02-12 DIAGNOSIS — M25.551 PAIN IN RIGHT HIP: Primary | ICD-10-CM

## 2018-02-12 PROCEDURE — 97140 MANUAL THERAPY 1/> REGIONS: CPT

## 2018-02-12 PROCEDURE — 97116 GAIT TRAINING THERAPY: CPT

## 2018-02-12 PROCEDURE — 97110 THERAPEUTIC EXERCISES: CPT

## 2018-02-12 PROCEDURE — 97112 NEUROMUSCULAR REEDUCATION: CPT

## 2018-02-12 NOTE — PROGRESS NOTES
Daily Note     Today's date: 2018  Patient name: Verda Mortimer  : 1960  MRN: [id number]  Referring provider: Rolo Galaviz MD  Dx:   Encounter Diagnosis   Name Primary?  Pain in right hip Yes                  Subjective: Pt reports R hip pain as "5-6/10" over the weekend that is reduced to a "1-2/10" upon arrival today  He says his hip "was very stiff over the weekend" due to increased activity  Objective: See treatment diary below    Manual                Gentle PA mobs    5' by NISREEN SarmientoT  5'  5'- by MAXINE Mccormick GC, PT              prone hip IR/ER ROM;  quad stretch  10'  5'  5'  5'  5'                                        Exercise Diary   18         SLR extension B/L  1x15  1 x15  2x10  2x10  np  -         SLR abduction B/L  1x15  1 x15  2x10  2x10  np -          Quad strap stretch  10"x10  10"x10  10"x10  10"x10  np -          Hamstring stretch  10"x10  10" x10  10"x10  10"x10  np 90/90 30"x3 ea         Slantboard     30" x4  10"x10  10"x10  10"x10  10"x10         Rockerboard- Balance AP/ML    1' ea   1 min'  1' ea 1' ea         HR/TR    20x    20ea   20 ea  20x ea         Clamshells     15x  OTB 15x  OTBx20  OTB  X 20 OTB 20x          Monster Walks/  Sidestepping      OTB x3  OTBx3  OTB  3x OTB  3x          Mini Squats        NP  20x  20x         Recumbent Bike 10'  10'  10'  10'  10' -           bridges 3"x10    3" 2x10  3"x20  3" x 20 5"x20          Hip add squeeze   5" x20  5"x20  5"x20  5" x20  10"x10          Hip abd OTB   5" x20  np  NP  np S/l 20x         SLR flexion  B/L      10  2x10  2 x 10 2x10 ea           alter G 60%, L14, XL       8 mins @ 2 4 mph  10 min  10' @   2 5mph            Modalities                     laser                         Assessment: Tolerated treatment well  Patient exhibited good technique with therapeutic exercises but req cuing for proper form on TR and squats   Most notable limitation in IR with manuals  Plan: Progress treatment as tolerated        Nery Yates, PTA

## 2018-02-13 ENCOUNTER — OFFICE VISIT (OUTPATIENT)
Dept: PHYSICAL THERAPY | Facility: OTHER | Age: 58
End: 2018-02-13
Payer: COMMERCIAL

## 2018-02-13 DIAGNOSIS — M25.551 PAIN IN RIGHT HIP: Primary | ICD-10-CM

## 2018-02-13 PROCEDURE — 97110 THERAPEUTIC EXERCISES: CPT

## 2018-02-13 PROCEDURE — 97140 MANUAL THERAPY 1/> REGIONS: CPT

## 2018-02-13 NOTE — PROGRESS NOTES
Daily Note     Today's date: 2018  Patient name: Ofelia Bee  : 1960  MRN: [id number]  Referring provider: Justus Jaramillo MD  Dx:   Encounter Diagnosis   Name Primary?  Pain in right hip Yes           1 on 1 9:00-9:30 IEP 9:30-9:55       Subjective: Patient reports L lateral hip pain 5/10  0/10 R hip pain, in which he is being treated for  L hip pain started yesterday         Objective: See treatment diary below    Manual              Gentle PA mobs    5' by Javon Wong DPT  5'  5'- by MAXINE Marsh GC, PT  NP            prone hip IR/ER ROM;  quad stretch  10'  5'  5'  5'  5'  5'            b/l hip supine PROM            5'              Exercise Diary   18     SLR extension B/L  1x15  1 x15  2x10  2x10  np  -  np      SLR abduction B/L  1x15  1 x15  2x10  2x10  np -   np     Quad strap stretch  10"x10  10"x10  10"x10  10"x10  np -   manual     Hamstring stretch  10"x10  10" x10  10"x10  10"x10  np 90/90 30"x3 ea  manual     Slantboard     30" x4  10"x10  10"x10  10"x10  10"x10  10"x10     Rockerboard- Balance AP/ML    1' ea   1 min'  1' ea 1' ea  np     HR/TR    20x    20ea   20 ea  20x ea  30 ea      Clamshells     15x  OTB 15x  OTBx20  OTB  X 20 OTB 20x   np resume     Monster Walks/   Sidestepping      OTB x3  OTBx3  OTB  3x OTB  3x   np resume     Mini Squats        NP  20x  20x  20     Recumbent Bike 10'  10'  10'  10'  10' -   NP      bridges 3"x10    3" 2x10  3"x20  3" x 20 5"x20   5"x20     Hip add squeeze   5" x20  5"x20  5"x20  5" x20  10"x10  np      Hip abd OTB   5" x20  np  NP  np S/l 20x  np     SLR flexion  B/L      10  2x10  2 x 10 2x10 ea   np     LTR       10"x10    ITB stretch       10"x10     alter G 60%, L14, XL       8 mins @ 2 4 mph  10 min  10' @   2 5mph  10' 2 5 mph        Modalities                     laser                         Assessment: Focused on b/l hip manual ROM today, significant tightness noted throughout  Resume all [place] if able  Added ITB stretch and LTR to address tightness  Plan: Cont  Per POC       Codie Pacheco, PTA

## 2018-02-16 ENCOUNTER — OFFICE VISIT (OUTPATIENT)
Dept: PHYSICAL THERAPY | Facility: OTHER | Age: 58
End: 2018-02-16
Payer: COMMERCIAL

## 2018-02-16 DIAGNOSIS — M25.551 PAIN IN RIGHT HIP: Primary | ICD-10-CM

## 2018-02-16 DIAGNOSIS — M25.551 RIGHT HIP PAIN: ICD-10-CM

## 2018-02-16 PROCEDURE — 97110 THERAPEUTIC EXERCISES: CPT | Performed by: PEDIATRICS

## 2018-02-16 PROCEDURE — 97112 NEUROMUSCULAR REEDUCATION: CPT | Performed by: PEDIATRICS

## 2018-02-16 PROCEDURE — 97140 MANUAL THERAPY 1/> REGIONS: CPT | Performed by: PEDIATRICS

## 2018-02-16 NOTE — PROGRESS NOTES
Daily Note     Today's date: 2018  Patient name: Wayne Aguilar  : 1960  MRN: [id number]  Referring provider: Olivia García MD  Dx:   Encounter Diagnoses   Name Primary?  Pain in right hip Yes    Right hip pain         900 - 915 IEP  915 - 957 1:1          Subjective: Pt  Denies pain in bilateral hips at start of treatment session  States he was able to walk a mile yesterday without increased pain        Objective: See treatment diary below  Manual            Gentle PA mobs    5' by NISREEN NarvaezT  5'  5'- by MAXINE Laws GC, PT  NP            prone hip IR/ER ROM;  quad stretch  10'  5'  5'  5'  5'  5'  5'          b/l hip supine PROM            5'  5'            Exercise Diary   1/29  1/30  2/1  2/6  2/9  2/12/18  2/13  2/15   SLR extension B/L  1x15  1 x15  2x10  2x10  np  -  np   np   SLR abduction B/L  1x15  1 x15  2x10  2x10  np -   np  np   Quad strap stretch  10"x10  10"x10  10"x10  10"x10  np -   manual     Hamstring stretch  10"x10  10" x10  10"x10  10"x10  np 90/90 30"x3 ea  manual     Slantboard     30" x4  10"x10  10"x10  10"x10  10"x10  10"x10  10" x 10   Rockerboard- Balance AP/ML    1' ea   1 min'  1' ea 1' ea  np  np   HR/TR    20x    20ea   20 ea  20x ea  30 ea   30 ea   Clamshells     15x  OTB 15x  OTBx20  OTB  X 20 OTB 20x   np resume  OTB  20x   Monster Walks/   Sidestepping      OTB x3  OTBx3  OTB  3x OTB  3x   np resume  GTB  3x   Mini Squats        NP  20x  20x  20  2 x 15   Recumbent Bike 10'  10'  10'  10'  10' -   NP  np    bridges 3"x10    3" 2x10  3"x20  3" x 20 5"x20   5"x20  5" x 20   Hip add squeeze   5" x20  5"x20  5"x20  5" x20  10"x10  np  5" x 20    Hip abd OTB   5" x20  np  NP  np S/l 20x  np  np   SLR flexion  B/L      10  2x10  2 x 10 2x10 ea   np  np   LTR       10"x10 np   ITB stretch       10"x10 np    alter G 60%, L14, XL       8 mins @ 2 4 mph  10 min  10' @   2 5mph  10' 2 5 mph  10' 2 8mph        Modalities                   laser                           Assessment: Pt  Demonstrates muscle fatigue with exercises  Decreased amount of exercises to limit fatigue  Tolerated treatment well  Patient demonstrated fatigue post treatment, exhibited good technique with therapeutic exercises and would benefit from continued PT      Plan: Continue per plan of care

## 2018-02-19 ENCOUNTER — OFFICE VISIT (OUTPATIENT)
Dept: PHYSICAL THERAPY | Facility: OTHER | Age: 58
End: 2018-02-19
Payer: COMMERCIAL

## 2018-02-19 DIAGNOSIS — M25.551 PAIN IN RIGHT HIP: Primary | ICD-10-CM

## 2018-02-19 DIAGNOSIS — M25.551 RIGHT HIP PAIN: ICD-10-CM

## 2018-02-19 PROCEDURE — 97110 THERAPEUTIC EXERCISES: CPT | Performed by: PHYSICAL THERAPIST

## 2018-02-19 PROCEDURE — 97112 NEUROMUSCULAR REEDUCATION: CPT | Performed by: PHYSICAL THERAPIST

## 2018-02-19 PROCEDURE — 97140 MANUAL THERAPY 1/> REGIONS: CPT | Performed by: PHYSICAL THERAPIST

## 2018-02-19 PROCEDURE — 97530 THERAPEUTIC ACTIVITIES: CPT | Performed by: PHYSICAL THERAPIST

## 2018-02-19 NOTE — PROGRESS NOTES
Daily Note     Today's date: 2018  Patient name: Zoila Hansen  : 1960  MRN: [id number]  Referring provider: Bridget Victor MD  Dx:   Encounter Diagnoses   Name Primary?  Pain in right hip Yes    Right hip pain      1 on 1   [id number]    Subjective: Patient reports minimal pain  He said he experiences increased muscle soreness during PT but he no longer has the pain he previously experienced  Patient's follow up with MD is on 2018  Objective: See treatment diary below    Precautions: Hx of CVA    Manual          Gentle PA mobs    5' by Marlise Schwab, DPT  5'  5'- by PT Alline Sellar' Marlise Schwab, PT  NP   [facility]        prone hip IR/ER ROM;  quad stretch  10'  5'  5'  5'  5'  5'  5' [facility]     MWM hip  flexion        [facility]      b/l hip supine PROM            5'  5'            Exercise Diary   2/9  2/12/18  2/13  2/15 2/19     Prone bent knee flexion      2 x 15 ea     SLR extension B/L  np  -  np   np np     SLR abduction B/L  np -   np  np np     Quad strap stretch  np -   manual   10 x 10"      Hamstring stretch  np 90/90 30"x3 ea  manual   10 x 10"      Slantboard   10"x10  10"x10  10"x10  10" x 10 10 x 10"      Rockerboard- Balance AP/ML 1' ea 1' ea  np  np np     HR/TR  20 ea  20x ea  30 ea    30 ea np     Clamshells   OTB  X 20 OTB 20x   np resume  OTB  20x OTB 30x ea     Monster Walks/   Sidestepping  OTB  3x OTB  3x   np resume  GTB  3x np     Mini Squats  20x  20x  20  2 x 15 np     Recumbent Bike  10' -   NP  np 10'       bridges  3" x 20 5"x20   5"x20  5" x 20 20 x 5"      Hip add squeeze  5" x20  10"x10  np  5" x 20 20 x 5"       Hip abd OTB  np S/l 20x  np  np np     SLR flexion  B/L  2 x 10 2x10 ea   np  np np     LTR   10"x10 np 10 x 10"      ITB stretch   10"x10 np np     Single leg press     30# 1 x 15 ea      alter G 60%, L14, XL  10 min  10' @   2 5mph  10' 2 5 mph  10' 2 8mph   np         Modalities                     laser                     Assessment: PT added prone bent knee flexion and single leg press  Patient reported difficulty with these exercises  Tolerated treatment well  Patient demonstrated fatigue post treatment, exhibited good technique with therapeutic exercises and would benefit from continued PT    Plan: Continue per plan of care

## 2018-02-20 ENCOUNTER — OFFICE VISIT (OUTPATIENT)
Dept: PHYSICAL THERAPY | Facility: OTHER | Age: 58
End: 2018-02-20
Payer: COMMERCIAL

## 2018-02-20 DIAGNOSIS — M25.551 PAIN IN RIGHT HIP: Primary | ICD-10-CM

## 2018-02-20 PROCEDURE — 97140 MANUAL THERAPY 1/> REGIONS: CPT

## 2018-02-20 PROCEDURE — 97110 THERAPEUTIC EXERCISES: CPT

## 2018-02-20 PROCEDURE — 97112 NEUROMUSCULAR REEDUCATION: CPT

## 2018-02-20 NOTE — PROGRESS NOTES
Daily Note     Today's date: 2018  Patient name: Kanika May  : 1960  MRN: [id number]  Referring provider: Riley Gaspar MD  Dx:   Encounter Diagnosis   Name Primary?  Pain in right hip Yes     1:1 with PTA CR 9:05- 10:10    Subjective: Sore following manuals and progression to leg press yesterday  Continues with deviated gait and difficulty with ascending steps due to pain  Objective: See treatment diary below    Precautions: Hx of CVA    Manual        Gentle PA mobs    5' by NISREEN NurT  5'  5'- by PT GC  5' GC, PT  NP   [facility]      prone hip IR/ER ROM;  quad stretch  10'  5'  5'  5'  5'  5'  5' [facility]  10'     LAD        [facility]    MWM hip  flexion        [facility]     b/l hip supine PROM            5'  5'    5'         Exercise Diary   2/9  2/12/18  2/13  2/15 2/19 2/10    Prone bent knee flexion      2 x 15 ea 2x15 ea  SLR extension B/L  np  -  np   np np NP    SLR abduction B/L  np -   np  np np NP    Quad strap stretch  np -   manual   10 x 10"  manual    Hamstring stretch  np 90/90 30"x3 ea  manual   10 x 10"  10"x10    Slantboard   10"x10  10"x10  10"x10  10" x 10 10 x 10"  10"x10    Rockerboard- Balance AP/ML 1' ea 1' ea  np  np np NP    HR/TR  20 ea  20x ea  30 ea  30 ea np NP    Clamshells   OTB  X 20 OTB 20x   np resume  OTB  20x OTB 30x ea OTB x30 ea  Monster Walks/   Sidestepping  OTB  3x OTB  3x   np resume  GTB  3x np NP    Mini Squats  20x  20x  20  2 x 15 np NP    Recumbent Bike  10' -   NP  np 10'  10 mins     bridges  3" x 20 5"x20   5"x20  5" x 20 20 x 5"  5"x20    Hip add squeeze  5" x20  10"x10  np  5" x 20 20 x 5"  5" x 20     Hip abd OTB  np S/l 20x  np  np np NP    SLR flexion  B/L  2 x 10 2x10 ea   np  np np NP    LTR   10"x10 np 10 x 10"  NP    ITB stretch   10"x10 np np NP    Single leg press     30# 1 x 15 ea 30# x 15 ea       alter G 60%, L14, XL  10 min  10' @   2 5mph  10' 2 5 mph  10' 2 8mph   np NP Modalities                     laser                       Assessment:   Tolerated treatment fair  Continues with PROM deficits-focused on manuals and self stretches today  Groin pain with manual hip flexion stretch  Decreased groin pain following MWM  Patient is slow and deliberate with exercise  Patient demonstrated fatigue post treatment, exhibited good technique with therapeutic exercises and would benefit from continued PT    Plan: Continue per plan of care

## 2018-02-23 ENCOUNTER — OFFICE VISIT (OUTPATIENT)
Dept: PHYSICAL THERAPY | Facility: OTHER | Age: 58
End: 2018-02-23
Payer: COMMERCIAL

## 2018-02-23 DIAGNOSIS — M25.551 PAIN IN RIGHT HIP: Primary | ICD-10-CM

## 2018-02-23 PROCEDURE — 97110 THERAPEUTIC EXERCISES: CPT

## 2018-02-23 PROCEDURE — 97140 MANUAL THERAPY 1/> REGIONS: CPT

## 2018-02-23 PROCEDURE — 97112 NEUROMUSCULAR REEDUCATION: CPT

## 2018-02-23 NOTE — PROGRESS NOTES
Daily Note     Today's date: 2018  Patient name: Jairo Doe  : 1960  MRN: [id number]  Referring provider: Alyson Conti MD  Dx:   Encounter Diagnosis   Name Primary?  Pain in right hip Yes     1:1 with PTA MP    Subjective: Patient states his walking is improving and he is able to walk about 30 mins a day, but continues to have discomfort  4/10 hip pain   Objective: See treatment diary below    Precautions: Hx of CVA    Manual      Gentle PA mobs  5' GC, PT  NP   [facility]  NP    prone hip IR/ER ROM;  quad stretch  5'  5'  5' [facility]  10'  5'   LAD    [facility] NP   MWM hip  flexion    [address] NP    b/l hip supine PROM    5'  5'    5'   R only today 5'      Exercise Diary   2/12/18  2/13  2/15 2/19 2/10 2/23   Prone bent knee flexion     2 x 15 ea 2x15 ea  NP   SLR extension B/L  -  np   np np NP 30   SLR abduction B/L -   np  np np NP np   Quad strap stretch -   manual   10 x 10"  manual manual   Hamstring stretch 90/90 30"x3 ea  manual   10 x 10"  10"x10 10"x10   Slantboard   10"x10  10"x10  10" x 10 10 x 10"  10"x10 np   Rockerboard- Balance AP/ML 1' ea  np  np np NP np   HR/TR  20x ea  30 ea  30 ea np NP np   Clamshells  OTB 20x   np resume  OTB  20x OTB 30x ea OTB x30 ea  OTB x30   Monster Walks/   Sidestepping OTB  3x   np resume  GTB  3x np NP np   Mini Squats  20x  20  2 x 15 np NP np   Recumbent Bike -   NP  np 10'  10 mins 10 mins    bridges 5"x20   5"x20  5" x 20 20 x 5"  5"x20 5"x 30   Hip add squeeze  10"x10  np  5" x 20 20 x 5"  5" x 20 np    Hip abd OTB S/l 20x  np  np np NP np   SLR flexion  B/L 2x10 ea   np  np np NP 30 ea  LTR  10"x10 np 10 x 10"  NP np   ITB stretch  10"x10 np np NP np   Single leg press    30# 1 x 15 ea 30# x 15 ea  30# x15 ea      alter G 60%, L14, XL  10' @   2 5mph  10' 2 5 mph  10' 2 8mph   np NP NP       Modalities            STM Laser 15W  5'            Assessment:  Patient continues to have sharp, pinching pain and muscle guarding in groin with most passive movements  Joint mobilizations may be more beneficial, cont when PT available  Positive response to STM with laser in glut  Patient stays his glut felt "perfect" post treatment  Plan: Continue per plan of care

## 2018-02-26 ENCOUNTER — OFFICE VISIT (OUTPATIENT)
Dept: PHYSICAL THERAPY | Facility: OTHER | Age: 58
End: 2018-02-26
Payer: COMMERCIAL

## 2018-02-26 DIAGNOSIS — M25.551 RIGHT HIP PAIN: ICD-10-CM

## 2018-02-26 DIAGNOSIS — M25.551 PAIN IN RIGHT HIP: Primary | ICD-10-CM

## 2018-02-26 PROCEDURE — 97110 THERAPEUTIC EXERCISES: CPT | Performed by: PHYSICAL THERAPIST

## 2018-02-26 PROCEDURE — 97112 NEUROMUSCULAR REEDUCATION: CPT | Performed by: PHYSICAL THERAPIST

## 2018-02-26 PROCEDURE — 97530 THERAPEUTIC ACTIVITIES: CPT | Performed by: PHYSICAL THERAPIST

## 2018-02-26 PROCEDURE — 97140 MANUAL THERAPY 1/> REGIONS: CPT | Performed by: PHYSICAL THERAPIST

## 2018-02-26 NOTE — PROGRESS NOTES
Daily Note     Today's date: 2018  Patient name: Leah Carreon  : 1960  MRN: [id number]  Referring provider: Jaja Isbell MD  Dx:   Encounter Diagnoses   Name Primary?  Pain in right hip Yes    Right hip pain        1 on 1  09:00AM-10:15AM    Subjective: Patient states his pain is a 0/10 today  He said he is feeling "pretty good "      Objective: See treatment diary below    Precautions: Hx of CVA    Manual         Gentle PA mobs  5' GC, PT np        prone hip IR/ER ROM;  quad stretch  5' 5' [facility]      LAD  np [facility]      MWM hip  flexion  np [facility]       b/l hip supine PROM   R only today 5' [facility]         Exercise Diary   18      Prone bent knee flexion   np       SLR extension B/L  - 30       SLR abduction B/L -  np       Quad strap stretch -  manual 10 x 10"       Hamstring stretch 90/90 30"x3 ea 10 x 10"  10 x 10"      Slantboard   10"x10 np 10 x 10"      Rockerboard- Balance AP/ML 1' ea np 10 x 10" ea      HR/TR  20x ea np       Clamshells  OTB 20x  OTB x30       Monster Walks/   Sidestepping OTB  3x  np OTB 5x      Mini Squats  20x np       Recumbent Bike -  10'        bridges 5"x20  30 x 5"       Hip add squeeze  10"x10 np        Hip abd OTB S/l 20x np       SLR flexion  B/L 2x10 ea  30 ea       LTR  np       ITB stretch  np       Single leg press  30# x 15 ea       curve   10'       SLS    10 x 10" ea      Step ups    20 ea      Lateral step ups    30       alter G 60%, L14, XL  10' @   2 5mph np           Modalities          STM Laser 15W  [address] [place] [address]          Assessment:  PT added several new exercises  Patient appeared to tolerate this well  He reported feeling a burn in the groin  Next visit perform re-evaluation  Plan: Continue per plan of care

## 2018-02-27 ENCOUNTER — EVALUATION (OUTPATIENT)
Dept: PHYSICAL THERAPY | Facility: OTHER | Age: 58
End: 2018-02-27
Payer: COMMERCIAL

## 2018-02-27 DIAGNOSIS — M25.551 PAIN IN RIGHT HIP: Primary | ICD-10-CM

## 2018-02-27 DIAGNOSIS — M25.551 RIGHT HIP PAIN: ICD-10-CM

## 2018-02-27 PROCEDURE — 97140 MANUAL THERAPY 1/> REGIONS: CPT | Performed by: PHYSICAL THERAPIST

## 2018-02-27 PROCEDURE — 97110 THERAPEUTIC EXERCISES: CPT | Performed by: PHYSICAL THERAPIST

## 2018-02-27 PROCEDURE — G8978 MOBILITY CURRENT STATUS: HCPCS | Performed by: PHYSICAL THERAPIST

## 2018-02-27 PROCEDURE — 97530 THERAPEUTIC ACTIVITIES: CPT | Performed by: PHYSICAL THERAPIST

## 2018-02-27 PROCEDURE — 97112 NEUROMUSCULAR REEDUCATION: CPT | Performed by: PHYSICAL THERAPIST

## 2018-02-27 PROCEDURE — G8979 MOBILITY GOAL STATUS: HCPCS | Performed by: PHYSICAL THERAPIST

## 2018-02-27 NOTE — PROGRESS NOTES
PT Evaluation     Today's date: 2018  Patient name: Talya Zamarripa  : 1960  MRN: [id number]  Referring provider: King Kojo MD  Dx:   Encounter Diagnoses   Name Primary?  Pain in right hip Yes    Right hip pain      Start Time: 0900  Stop Time: 1015  Total time in clinic (min): 75 minutes    Assessment  Impairments: abnormal or restricted ROM, activity intolerance, impaired physical strength and pain with function    Assessment details: Talya Zamarripa is a 59 y o  male who presents with complaints of pain in the right hip  (primary encounter diagnosis)  Patient is doing very well at this time  Patient presents to PT with impaired strength, impaired ROM, decreased flexibility and impaired ability to complete IADLs  Prognosis is good given HEP compliance and PT 2-3x/wk  Positive prognostic indicators include positive attitude toward recovery  Please contact me if you have any questions or recommendations  Thank you for the opportunity to share in [facility]'s care       Understanding of Dx/Px/POC: good   Prognosis: good    Goals  Short Term:  Pt will report decreased levels of pain by at least 2 subjective ratings in 4 weeks- MET  Pt will demonstrate improved ROM by at least 10 degrees in 4 weeks- PARTIALLY MET  Pt will demonstrate improved strength by 1/2 grade- PARTIALLY MET  Long Term:   Pt will be independent in their HEP in 8 weeks- PARTIALLY MET  Pt will be be pain free with IADL's- PARTIALLY MET  Pt will demonstrate improved FOTO score- MET  Patient's Goals:  "Patient reports he would like to be pain free" - PARTIALLY MET    Plan  Planned modality interventions: cryotherapy, thermotherapy: hydrocollator packs, TENS and low level laser therapy  Planned therapy interventions: balance, flexibility, functional ROM exercises, gait training, home exercise program, manual therapy, joint mobilization, neuromuscular re-education, patient education, strengthening, stretching, therapeutic activities, therapeutic exercise and therapeutic training  Frequency: 3x week  Duration in weeks: 4  Treatment plan discussed with: patient and PCP  Plan details: PT and patient discussed possible discharge  He said he would like to continue with PT for a little longer in order to improve his ability to walk  Patient said he would like to be able to walk 2 miles without any issues  Subjective Evaluation    History of Present Illness  Onset date: 2 years ago  Mechanism of injury: Patient reports he is 60% at this time  He told PT that he continues to have issues walking  He said he experiences increased R hip pain when he negotiates side walks and curbs  He believes his pain may be associated with the cold weather  Quality of life: good    Pain  Current pain ratin  At best pain ratin  At worst pain ratin  Location: Right Hip   Quality: dull ache  Relieving factors: heat, rest and change in position  Aggravating factors: walking and stair climbing      Diagnostic Tests  X-ray: abnormal  Treatments  No previous or current treatments  Previous treatment: injection treatment  Current treatment: physical therapy  Patient Goals  Patient goals for therapy: decreased pain, improved balance, increased motion, increased strength and independence with ADLs/IADLs  Patient goal: "Patient reports he would like to be pain free "          Objective     Lumbar Screen  Lumbar range of motion within normal limits      Active Range of Motion   Left Hip   Flexion: 105 degrees   Extension: 15 degrees   Abduction: 30 degrees     Right Hip   Flexion: 105 degrees   Extension: 15 degrees   Abduction: 30 degrees     Passive Range of Motion   Left Hip   Flexion: 115 degrees   Extension: 15 degrees   Abduction: 30 degrees   External rotation (prone): 35 degrees   Internal rotation (prone): 5 degrees     Right Hip   Flexion: 115 degrees   Extension: 15 degrees   Abduction: 30 degrees   External rotation (prone): 35 degrees Internal rotation (prone): 5 degrees     Strength/Myotome Testing     Left Hip   Planes of Motion   Flexion: 4+  Extension: 4  Abduction: 5  Adduction: 5    Right Hip   Planes of Motion   Flexion: 4+  Extension: 4  Abduction: 5  Adduction: 5    Left Knee   Flexion: 5  Extension: 5    Right Knee   Flexion: 5  Extension: 5    Additional Strength Details  Glute MMT:   R 4+/5   L4+/5    Tests     Left Hip   Positive Ely's  Negative JAN, FADIR and scour  Right Hip   Positive Ely's, JAN, FADIR and scour       Additional Tests Details  Log Roll - but IR is limited bilaterally with this test      Precautions: history of stroke, history of heart attack, hard of hearing    Manual   2/12 2/23 2/23 2/26 2/27    Gentle PA mobs  5' GC, PT np        prone hip IR/ER ROM;  quad stretch  5' 5' [facility]    LAD  np [facility]    MWM hip  flexion  np [facility]     b/l hip supine PROM   R only today 5' [address] [facility]       Exercise Diary   2/12/18 2/23 2/26 2/27     Prone bent knee flexion   np       SLR extension B/L  - 30       SLR abduction B/L -  np       Quad strap stretch -  manual 10 x 10"  10 x 10"      Hamstring stretch 90/90 30"x3 ea 10 x 10"  10 x 10" 10 x 10"     Slantboard   10"x10 np 10 x 10" 10 x 10"     Rockerboard- Balance AP/ML 1' ea np 10 x 10" ea 10 x 10"      HR/TR  20x ea np       Clamshells  OTB 20x  OTB x30       Monster Walks/   Sidestepping OTB  3x  np OTB 5x OTB 5x     Mini Squats  20x np       Recumbent Bike -  10'        bridges 5"x20  30 x 5"       Hip add squeeze  10"x10 np        Hip abd OTB S/l 20x np       SLR flexion  B/L 2x10 ea  30 ea       LTR  np       ITB stretch  np       Single leg press  30# x 15 ea       curve   10'  10'      SLS    10 x 10" ea 10 x 10" ea     Step ups    20 ea 20 ea     Lateral step ups    30 30      alter G 60%, L14, XL  10' @   2 5mph np           Modalities  2/23 2/26        [facility] [employer] 15W  5' [address]          Flowsheet Rows    Flowsheet Row Most Recent Value PT/OT G-Codes   Current Score  81   Projected Score  60   FOTO information reviewed  Yes   Assessment Type  Re-evaluation   G code set  Mobility: Walking & Moving Around   Mobility: Walking and Moving Around Current Status ()  CI   Mobility: Walking and Moving Around Goal Status ()  CK

## 2018-03-02 ENCOUNTER — OFFICE VISIT (OUTPATIENT)
Dept: PHYSICAL THERAPY | Facility: OTHER | Age: 58
End: 2018-03-02
Payer: COMMERCIAL

## 2018-03-02 DIAGNOSIS — M25.551 PAIN IN RIGHT HIP: Primary | ICD-10-CM

## 2018-03-02 DIAGNOSIS — M25.551 RIGHT HIP PAIN: ICD-10-CM

## 2018-03-02 PROCEDURE — 97110 THERAPEUTIC EXERCISES: CPT | Performed by: PEDIATRICS

## 2018-03-02 PROCEDURE — 97530 THERAPEUTIC ACTIVITIES: CPT | Performed by: PEDIATRICS

## 2018-03-02 PROCEDURE — 97112 NEUROMUSCULAR REEDUCATION: CPT | Performed by: PEDIATRICS

## 2018-03-02 PROCEDURE — 97140 MANUAL THERAPY 1/> REGIONS: CPT | Performed by: PEDIATRICS

## 2018-03-02 NOTE — PROGRESS NOTES
Daily Note     Today's date: 3/2/2018  Patient name: Jaylyn Perrin  : 1960  MRN: [id number]  Referring provider: Maranda Dakin, MD  Dx:   Encounter Diagnosis     ICD-10-CM    1  Pain in right hip M25 551    2  Right hip pain M25 551               1:1 with PT [facility] and PTA EW for duration of treatment session  Subjective: Pt  Denies hip pain at start of treatment session  Objective: See treatment diary below  Precautions: history of stroke, history of heart attack, hard of hearing    Manual   2/12 2/23 2/23 2/26 2/27 3/2   Gentle PA mobs  5' GC, PT np        prone hip IR/ER ROM;  quad stretch  5' 5' [facility]   LAD  np [facility]   MWM hip  flexion  np [facility]    b/l hip supine PROM   R only today 5' [facility]      Exercise Diary   2/12/18 2/23 2/26 2/27 3/2    Quad strap stretch -  manual 10 x 10"  10 x 10"  10" x 10    Hamstring stretch 90/90 30"x3 ea 10 x 10"  10 x 10" 10 x 10" 10" x 10    Slantboard   10"x10 np 10 x 10" 10 x 10" 10" x 10    Rockerboard- Balance AP/ML 1' ea np 10 x 10" ea 10 x 10"  10" x 10    Clamshells  OTB 20x  OTB x30       Monster Walks/   Sidestepping OTB  3x  np OTB 5x OTB 5x OTB x 5    Mini Squats  20x np        bridges 5"x20  30 x 5"       Hip add squeeze  10"x10 np        Hip abd OTB S/l 20x np       SLR flexion  B/L 2x10 ea  30 ea       Single leg press  30# x 15 ea       curve   10'  10'  10'    SLS    10 x 10" ea 10 x 10" ea 10" x 10    Step ups    20 ea 20 ea 30 ea    Lateral step ups    30 30 30 ea     alter G 60%, L14, XL  10' @   2 5mph np           Modalities          STM Laser 15W  5' [address]              Assessment: Challenged with rockerboard balance nmedial-lateral hold but able to self correct when slightly off balance  Muscle Fatigue noted bilaterally with SL balance  Tolerated treatment well  Patient would benefit from continued PT      Plan: Continue per plan of care

## 2018-03-05 ENCOUNTER — APPOINTMENT (OUTPATIENT)
Dept: PHYSICAL THERAPY | Facility: OTHER | Age: 58
End: 2018-03-05
Payer: COMMERCIAL

## 2018-03-06 ENCOUNTER — OFFICE VISIT (OUTPATIENT)
Dept: PHYSICAL THERAPY | Facility: OTHER | Age: 58
End: 2018-03-06
Payer: COMMERCIAL

## 2018-03-06 DIAGNOSIS — M25.551 RIGHT HIP PAIN: ICD-10-CM

## 2018-03-06 DIAGNOSIS — M25.551 PAIN IN RIGHT HIP: Primary | ICD-10-CM

## 2018-03-06 PROCEDURE — 97112 NEUROMUSCULAR REEDUCATION: CPT | Performed by: PHYSICAL THERAPIST

## 2018-03-06 PROCEDURE — 97110 THERAPEUTIC EXERCISES: CPT | Performed by: PHYSICAL THERAPIST

## 2018-03-06 PROCEDURE — 97140 MANUAL THERAPY 1/> REGIONS: CPT | Performed by: PHYSICAL THERAPIST

## 2018-03-06 NOTE — PROGRESS NOTES
Daily Note     Today's date: 3/6/2018  Patient name: Jairo Doe  : 1960  MRN: [id number]  Referring provider: Alyson Conti MD  Dx:   Encounter Diagnosis     ICD-10-CM    1  Pain in right hip M25 551    2  Right hip pain M25 551        Start Time: 08  Stop Time: 1001  Total time in clinic (min): 66 minutes    Subjective: Patient notes he continues to have moderate stiffness in hip  Objective: See treatment diary below  Manual  3/6        Gentle PA mobs          prone hip IR/ER ROM;  quad stretch RS        LAD RS        MWM hip  flexion RS         b/l hip supine PROM RS           Exercise Diary  3/6         Quad strap stretch 10x10"         Hamstring stretch 10x10"         Slantboard  10x10"         Rockerboard- Balance AP/ML 10x10"         Clamshells           Monster Walks/   Sidestepping OTB x5 TB around ankles         Mini Squats            bridges           Hip add squeeze            Hip abd OTB           SLR flexion  B/L           Single leg press           curve 10'         SLS           Step ups  30 ea         Lateral step ups  30 ea          alter G 60%, L14, XL                 Modalities           STM Laser 15W  5' [address]                Assessment: Tolerated treatment well  Patient would benefit from continued PT  Patient had improved hip mobility during ambulation post manuals  Patient performed exercises with good tolerance  Plan: Continue per plan of care

## 2018-03-09 ENCOUNTER — OFFICE VISIT (OUTPATIENT)
Dept: PHYSICAL THERAPY | Facility: OTHER | Age: 58
End: 2018-03-09
Payer: COMMERCIAL

## 2018-03-09 DIAGNOSIS — M25.551 RIGHT HIP PAIN: ICD-10-CM

## 2018-03-09 DIAGNOSIS — M25.551 PAIN IN RIGHT HIP: Primary | ICD-10-CM

## 2018-03-09 PROCEDURE — 97110 THERAPEUTIC EXERCISES: CPT | Performed by: PHYSICAL THERAPIST

## 2018-03-09 PROCEDURE — G8978 MOBILITY CURRENT STATUS: HCPCS | Performed by: PHYSICAL THERAPIST

## 2018-03-09 PROCEDURE — 97530 THERAPEUTIC ACTIVITIES: CPT | Performed by: PHYSICAL THERAPIST

## 2018-03-09 PROCEDURE — 97140 MANUAL THERAPY 1/> REGIONS: CPT | Performed by: PHYSICAL THERAPIST

## 2018-03-09 PROCEDURE — G8979 MOBILITY GOAL STATUS: HCPCS | Performed by: PHYSICAL THERAPIST

## 2018-03-09 PROCEDURE — 97112 NEUROMUSCULAR REEDUCATION: CPT | Performed by: PHYSICAL THERAPIST

## 2018-03-09 NOTE — PROGRESS NOTES
Daily Note     Today's date: 3/9/2018  Patient name: Carole Reilly  : 1960  MRN: [id number]  Referring provider: Bro Tapia MD  Dx:   Encounter Diagnosis     ICD-10-CM    1  Pain in right hip M25 551    2  Right hip pain M25 551        Start Time: 0900  Stop Time: 1010  Total time in clinic (min): 70 minutes    Subjective: Patient told PT he will be seeing MD next Tuesday  PT performed RE last week  PT told patient to call PT clinic and inform them of what MD says  PT updated his HEP  Patient reported increased groin soreness due to new exercises  Objective: See treatment diary below  Manual  3/6 3/8       Gentle PA mobs  GRC        prone hip IR/ER ROM;  quad stretch RS GRC       LAD RS [facility]       MWM hip  flexion RS [facility]        b/l hip supine PROM RS [facility]          Exercise Diary  3/6 3/8        Quad strap stretch 10x10" 10 x 10"         Hamstring stretch 10x10" 10 x 10"         Slantboard  10x10" 10 x 10"        Rockerboard- Balance AP/ML 10x10" 10 x 10"         Clamshells           Monster Walks/   Sidestepping OTB x5 TB around ankles MTB x5   TB around knees        Mini Squats            bridges           Hip add squeeze            Hip abd OTB           SLR flexion  B/L           Storks   1 x 15 GTB       Wobbleboard   10 x 10" ea       HR/TR  30 ea       Single leg press           curve 10' 10'         SLS   On foam   10 x 10"         Step ups  30 ea 30 ea        Lateral step ups  30 ea 30 ea         alter G 60%, L14, XL                 Modalities  2/23  2/26  3/8       STM Laser 15W  5' [address]                Assessment: Tolerated treatment well  Patient demonstrated fatigue post treatment, exhibited good technique with therapeutic exercises and would benefit from continued PT  PT updated HEP  Patient instructed to call PT and inform him of doctors decision  Plan: Continue per plan of care

## 2018-03-13 ENCOUNTER — OFFICE VISIT (OUTPATIENT)
Dept: OBGYN CLINIC | Facility: HOSPITAL | Age: 58
End: 2018-03-13
Payer: COMMERCIAL

## 2018-03-13 VITALS
WEIGHT: 197.4 LBS | HEART RATE: 63 BPM | DIASTOLIC BLOOD PRESSURE: 76 MMHG | BODY MASS INDEX: 26.74 KG/M2 | SYSTOLIC BLOOD PRESSURE: 118 MMHG | HEIGHT: 72 IN

## 2018-03-13 DIAGNOSIS — M16.11 PRIMARY OSTEOARTHRITIS OF ONE HIP, RIGHT: Primary | ICD-10-CM

## 2018-03-13 PROCEDURE — 99213 OFFICE O/P EST LOW 20 MIN: CPT | Performed by: ORTHOPAEDIC SURGERY

## 2018-03-13 RX ORDER — ATORVASTATIN CALCIUM 80 MG/1
TABLET, FILM COATED ORAL
COMMUNITY
Start: 2017-12-20

## 2018-03-13 RX ORDER — CLOPIDOGREL BISULFATE 75 MG/1
TABLET ORAL
COMMUNITY
Start: 2018-01-14

## 2018-03-13 RX ORDER — ASPIRIN 325 MG
1 TABLET ORAL DAILY
COMMUNITY

## 2018-03-13 RX ORDER — CARVEDILOL PHOSPHATE 80 MG
CAPSULE,EXTENDED RELEASE MULTIPHASE 24HR ORAL
COMMUNITY
Start: 2017-12-20

## 2018-04-22 NOTE — PROGRESS NOTES
PT performed RE/DC the week prior to his last visit which occurred on 3/9/2018  Patient did very well with PT and at this time he has been discharged in [address] [place] [address]    Thank you for your referral

## 2018-05-18 ENCOUNTER — APPOINTMENT (OUTPATIENT)
Dept: LAB | Age: 58
End: 2018-05-18
Payer: COMMERCIAL

## 2018-05-18 ENCOUNTER — TRANSCRIBE ORDERS (OUTPATIENT)
Dept: ADMINISTRATIVE | Age: 58
End: 2018-05-18

## 2018-05-18 DIAGNOSIS — N40.0 BENIGN PROSTATIC HYPERPLASIA, UNSPECIFIED WHETHER LOWER URINARY TRACT SYMPTOMS PRESENT: ICD-10-CM

## 2018-05-18 DIAGNOSIS — E78.5 HYPERLIPIDEMIA, UNSPECIFIED HYPERLIPIDEMIA TYPE: ICD-10-CM

## 2018-05-18 DIAGNOSIS — R73.03 DIABETES MELLITUS, LATENT: ICD-10-CM

## 2018-05-18 DIAGNOSIS — Z00.00 ROUTINE GENERAL MEDICAL EXAMINATION AT A HEALTH CARE FACILITY: ICD-10-CM

## 2018-05-18 DIAGNOSIS — Z00.00 ROUTINE GENERAL MEDICAL EXAMINATION AT A HEALTH CARE FACILITY: Primary | ICD-10-CM

## 2018-05-18 LAB
BASOPHILS # BLD AUTO: 0.04 THOUSANDS/ΜL (ref 0–0.1)
BASOPHILS NFR BLD AUTO: 0 % (ref 0–1)
BILIRUB UR QL STRIP: NEGATIVE
CHOLEST SERPL-MCNC: 94 MG/DL (ref 50–200)
CLARITY UR: CLEAR
COLOR UR: NORMAL
EOSINOPHIL # BLD AUTO: 0.41 THOUSAND/ΜL (ref 0–0.61)
EOSINOPHIL NFR BLD AUTO: 5 % (ref 0–6)
ERYTHROCYTE [DISTWIDTH] IN BLOOD BY AUTOMATED COUNT: 13.8 % (ref 11.6–15.1)
EST. AVERAGE GLUCOSE BLD GHB EST-MCNC: 126 MG/DL
GLUCOSE UR STRIP-MCNC: NEGATIVE MG/DL
HBA1C MFR BLD: 6 % (ref 4.2–6.3)
HCT VFR BLD AUTO: 40.6 % (ref 36.5–49.3)
HDLC SERPL-MCNC: 32 MG/DL (ref 40–60)
HGB BLD-MCNC: 13.9 G/DL (ref 12–17)
HGB UR QL STRIP.AUTO: NEGATIVE
KETONES UR STRIP-MCNC: NEGATIVE MG/DL
LDLC SERPL CALC-MCNC: 35 MG/DL (ref 0–100)
LDLC SERPL DIRECT ASSAY-MCNC: 56 MG/DL (ref 0–100)
LEUKOCYTE ESTERASE UR QL STRIP: NEGATIVE
LYMPHOCYTES # BLD AUTO: 1.73 THOUSANDS/ΜL (ref 0.6–4.47)
LYMPHOCYTES NFR BLD AUTO: 19 % (ref 14–44)
MCH RBC QN AUTO: 33.5 PG (ref 26.8–34.3)
MCHC RBC AUTO-ENTMCNC: 34.2 G/DL (ref 31.4–37.4)
MCV RBC AUTO: 98 FL (ref 82–98)
MONOCYTES # BLD AUTO: 1.09 THOUSAND/ΜL (ref 0.17–1.22)
MONOCYTES NFR BLD AUTO: 12 % (ref 4–12)
NEUTROPHILS # BLD AUTO: 5.93 THOUSANDS/ΜL (ref 1.85–7.62)
NEUTS SEG NFR BLD AUTO: 64 % (ref 43–75)
NITRITE UR QL STRIP: NEGATIVE
NONHDLC SERPL-MCNC: 62 MG/DL
NRBC BLD AUTO-RTO: 0 /100 WBCS
PH UR STRIP.AUTO: 6 [PH] (ref 4.5–8)
PLATELET # BLD AUTO: 228 THOUSANDS/UL (ref 149–390)
PMV BLD AUTO: 11.7 FL (ref 8.9–12.7)
PROT UR STRIP-MCNC: NEGATIVE MG/DL
PSA SERPL-MCNC: 1.4 NG/ML (ref 0–4)
RBC # BLD AUTO: 4.15 MILLION/UL (ref 3.88–5.62)
SP GR UR STRIP.AUTO: 1.02 (ref 1–1.03)
TRIGL SERPL-MCNC: 137 MG/DL
UROBILINOGEN UR QL STRIP.AUTO: 1 E.U./DL
WBC # BLD AUTO: 9.21 THOUSAND/UL (ref 4.31–10.16)

## 2018-05-18 PROCEDURE — 80061 LIPID PANEL: CPT

## 2018-05-18 PROCEDURE — 81003 URINALYSIS AUTO W/O SCOPE: CPT | Performed by: INTERNAL MEDICINE

## 2018-05-18 PROCEDURE — 83721 ASSAY OF BLOOD LIPOPROTEIN: CPT

## 2018-05-18 PROCEDURE — 85025 COMPLETE CBC W/AUTO DIFF WBC: CPT

## 2018-05-18 PROCEDURE — 84153 ASSAY OF PSA TOTAL: CPT

## 2018-05-18 PROCEDURE — 36415 COLL VENOUS BLD VENIPUNCTURE: CPT

## 2018-05-18 PROCEDURE — 83036 HEMOGLOBIN GLYCOSYLATED A1C: CPT

## 2018-06-19 ENCOUNTER — OFFICE VISIT (OUTPATIENT)
Dept: OBGYN CLINIC | Facility: HOSPITAL | Age: 58
End: 2018-06-19
Payer: COMMERCIAL

## 2018-06-19 VITALS
WEIGHT: 185.2 LBS | DIASTOLIC BLOOD PRESSURE: 76 MMHG | SYSTOLIC BLOOD PRESSURE: 128 MMHG | HEIGHT: 72 IN | BODY MASS INDEX: 25.09 KG/M2 | HEART RATE: 67 BPM

## 2018-06-19 DIAGNOSIS — M16.11 PRIMARY OSTEOARTHRITIS OF ONE HIP, RIGHT: Primary | ICD-10-CM

## 2018-06-19 PROCEDURE — 99213 OFFICE O/P EST LOW 20 MIN: CPT | Performed by: ORTHOPAEDIC SURGERY

## 2018-06-19 RX ORDER — AZITHROMYCIN 250 MG/1
TABLET, FILM COATED ORAL
Refills: 0 | COMMUNITY
Start: 2018-06-13

## 2018-06-19 NOTE — PROGRESS NOTES
59 y o male presenting to the office for follow up on right hip osteoarthritis  Patient states that the hip is not limiting his regular activities  He is able to walk 1 hour daily and can complete his ADLs and IADLs  He does not need to take any regular anti-inflammatory medications  He denies any concerns or complaints at this time  Review of Systems  Review of systems negative unless otherwise specified in HPI    Past Medical History  No past medical history on file  Past Surgical History  No past surgical history on file  Current Medications  Current Outpatient Prescriptions on File Prior to Visit   Medication Sig Dispense Refill    aspirin 325 mg tablet Take 1 tablet by mouth daily      atorvastatin (LIPITOR) 80 mg tablet       clopidogrel (PLAVIX) 75 mg tablet       COREG CR 80 MG 24 hr capsule       Cyclobenzaprine HCl (FLEXERIL PO) Flexeril 10 MG; Take one tablet every 8 hours as needed TDD: 30 mg MDD:30 mg TDD:30 mg; 15; 0; 30-Mar-2017; 30-Mar-2017; Damien Elmore; Active       No current facility-administered medications on file prior to visit          Recent Labs [facility])    0  Lab Value Date/Time   HCT 40 6 05/18/2018 0633   HCT 41 7 10/15/2015 0941   HGB 13 9 05/18/2018 0633   HGB 14 1 10/15/2015 0941   WBC 9 21 05/18/2018 0633   WBC 8 77 10/15/2015 0941   GLUCOSE 127 10/10/2016 0653   GLUCOSE 88 10/15/2015 0941   HGBA1C 6 0 05/18/2018 0633   HGBA1C 6 2 (H) 04/24/2015 0714         Physical exam  · General: Awake, Alert, Oriented  · Eyes: Pupils equal, round and reactive to light  · Heart: regular rate and rhythm  · Lungs: No audible wheezing  · Abdomen: soft  right Lower extremity  · nontender to palpation of the right anterior hip  · Patient has some limitations in all directions of hip ROM without pain  · Strength 5/5 to hip flexion  · Sensation intact distally    Imaging  None needed    Procedure  none    Assessment/Plan:   57 y o male with osteoarthritis of the right hip  - patient can get corticosteroid injection q 3 months PRN  --- states that he does not need one at this time  - follow up PRN

## 2019-03-27 ENCOUNTER — TELEPHONE (OUTPATIENT)
Dept: SURGICAL ONCOLOGY | Facility: CLINIC | Age: 59
End: 2019-03-27

## 2019-05-02 ENCOUNTER — TRANSCRIBE ORDERS (OUTPATIENT)
Dept: ADMINISTRATIVE | Age: 59
End: 2019-05-02

## 2019-05-02 ENCOUNTER — APPOINTMENT (OUTPATIENT)
Dept: LAB | Age: 59
End: 2019-05-02
Payer: COMMERCIAL

## 2019-05-02 DIAGNOSIS — I63.30 CEREBRAL THROMBOSIS WITH CEREBRAL INFARCTION (HCC): ICD-10-CM

## 2019-05-02 DIAGNOSIS — I10 ESSENTIAL HYPERTENSION, MALIGNANT: ICD-10-CM

## 2019-05-02 DIAGNOSIS — I25.10 ATHEROSCLEROSIS OF NATIVE CORONARY ARTERY OF NATIVE HEART WITHOUT ANGINA PECTORIS: ICD-10-CM

## 2019-05-02 DIAGNOSIS — E78.2 MIXED HYPERLIPIDEMIA: Primary | ICD-10-CM

## 2019-05-02 DIAGNOSIS — E78.2 MIXED HYPERLIPIDEMIA: ICD-10-CM

## 2019-05-02 DIAGNOSIS — N40.0 BENIGN PROSTATIC HYPERPLASIA WITHOUT LOWER URINARY TRACT SYMPTOMS: ICD-10-CM

## 2019-05-02 LAB
ALBUMIN SERPL BCP-MCNC: 3.3 G/DL (ref 3.5–5)
ALP SERPL-CCNC: 125 U/L (ref 46–116)
ALT SERPL W P-5'-P-CCNC: 19 U/L (ref 12–78)
ANION GAP SERPL CALCULATED.3IONS-SCNC: 5 MMOL/L (ref 4–13)
AST SERPL W P-5'-P-CCNC: 16 U/L (ref 5–45)
BASOPHILS # BLD AUTO: 0.07 THOUSANDS/ΜL (ref 0–0.1)
BASOPHILS NFR BLD AUTO: 1 % (ref 0–1)
BILIRUB SERPL-MCNC: 1.04 MG/DL (ref 0.2–1)
BILIRUB UR QL STRIP: NEGATIVE
BUN SERPL-MCNC: 16 MG/DL (ref 5–25)
CALCIUM SERPL-MCNC: 8.8 MG/DL (ref 8.3–10.1)
CHLORIDE SERPL-SCNC: 108 MMOL/L (ref 100–108)
CHOLEST SERPL-MCNC: 96 MG/DL (ref 50–200)
CLARITY UR: CLEAR
CO2 SERPL-SCNC: 25 MMOL/L (ref 21–32)
COLOR UR: YELLOW
CREAT SERPL-MCNC: 0.98 MG/DL (ref 0.6–1.3)
EOSINOPHIL # BLD AUTO: 0.5 THOUSAND/ΜL (ref 0–0.61)
EOSINOPHIL NFR BLD AUTO: 5 % (ref 0–6)
ERYTHROCYTE [DISTWIDTH] IN BLOOD BY AUTOMATED COUNT: 13.6 % (ref 11.6–15.1)
GFR SERPL CREATININE-BSD FRML MDRD: 83 ML/MIN/1.73SQ M
GLUCOSE P FAST SERPL-MCNC: 99 MG/DL (ref 65–99)
GLUCOSE UR STRIP-MCNC: NEGATIVE MG/DL
HCT VFR BLD AUTO: 41.6 % (ref 36.5–49.3)
HDLC SERPL-MCNC: 31 MG/DL (ref 40–60)
HGB BLD-MCNC: 13.5 G/DL (ref 12–17)
HGB UR QL STRIP.AUTO: NEGATIVE
IMM GRANULOCYTES # BLD AUTO: 0.03 THOUSAND/UL (ref 0–0.2)
IMM GRANULOCYTES NFR BLD AUTO: 0 % (ref 0–2)
KETONES UR STRIP-MCNC: NEGATIVE MG/DL
LDLC SERPL CALC-MCNC: 47 MG/DL (ref 0–100)
LDLC SERPL DIRECT ASSAY-MCNC: 52 MG/DL (ref 0–100)
LEUKOCYTE ESTERASE UR QL STRIP: NEGATIVE
LYMPHOCYTES # BLD AUTO: 1.9 THOUSANDS/ΜL (ref 0.6–4.47)
LYMPHOCYTES NFR BLD AUTO: 21 % (ref 14–44)
MCH RBC QN AUTO: 32.5 PG (ref 26.8–34.3)
MCHC RBC AUTO-ENTMCNC: 32.5 G/DL (ref 31.4–37.4)
MCV RBC AUTO: 100 FL (ref 82–98)
MONOCYTES # BLD AUTO: 1.03 THOUSAND/ΜL (ref 0.17–1.22)
MONOCYTES NFR BLD AUTO: 11 % (ref 4–12)
NEUTROPHILS # BLD AUTO: 5.71 THOUSANDS/ΜL (ref 1.85–7.62)
NEUTS SEG NFR BLD AUTO: 62 % (ref 43–75)
NITRITE UR QL STRIP: NEGATIVE
NONHDLC SERPL-MCNC: 65 MG/DL
NRBC BLD AUTO-RTO: 0 /100 WBCS
PH UR STRIP.AUTO: 6 [PH]
PLATELET # BLD AUTO: 243 THOUSANDS/UL (ref 149–390)
PMV BLD AUTO: 11 FL (ref 8.9–12.7)
POTASSIUM SERPL-SCNC: 4.6 MMOL/L (ref 3.5–5.3)
PROT SERPL-MCNC: 7.1 G/DL (ref 6.4–8.2)
PROT UR STRIP-MCNC: NEGATIVE MG/DL
PSA SERPL-MCNC: 1.3 NG/ML (ref 0–4)
RBC # BLD AUTO: 4.16 MILLION/UL (ref 3.88–5.62)
SODIUM SERPL-SCNC: 138 MMOL/L (ref 136–145)
SP GR UR STRIP.AUTO: 1.02 (ref 1–1.03)
TRIGL SERPL-MCNC: 92 MG/DL
UROBILINOGEN UR QL STRIP.AUTO: 0.2 E.U./DL
WBC # BLD AUTO: 9.24 THOUSAND/UL (ref 4.31–10.16)

## 2019-05-02 PROCEDURE — 80061 LIPID PANEL: CPT

## 2019-05-02 PROCEDURE — 81003 URINALYSIS AUTO W/O SCOPE: CPT | Performed by: INTERNAL MEDICINE

## 2019-05-02 PROCEDURE — G0103 PSA SCREENING: HCPCS

## 2019-05-02 PROCEDURE — 85025 COMPLETE CBC W/AUTO DIFF WBC: CPT

## 2019-05-02 PROCEDURE — 80053 COMPREHEN METABOLIC PANEL: CPT

## 2019-05-02 PROCEDURE — 83721 ASSAY OF BLOOD LIPOPROTEIN: CPT

## 2019-05-02 PROCEDURE — 36415 COLL VENOUS BLD VENIPUNCTURE: CPT

## 2020-02-07 ENCOUNTER — TRANSCRIBE ORDERS (OUTPATIENT)
Dept: ADMINISTRATIVE | Age: 60
End: 2020-02-07

## 2020-02-07 ENCOUNTER — APPOINTMENT (OUTPATIENT)
Dept: LAB | Age: 60
End: 2020-02-07
Payer: COMMERCIAL

## 2020-02-07 DIAGNOSIS — I25.119 ATHEROSCLEROSIS OF NATIVE CORONARY ARTERY WITH ANGINA PECTORIS, UNSPECIFIED WHETHER NATIVE OR TRANSPLANTED HEART (HCC): ICD-10-CM

## 2020-02-07 DIAGNOSIS — I10 ESSENTIAL HYPERTENSION, MALIGNANT: ICD-10-CM

## 2020-02-07 DIAGNOSIS — E78.2 MIXED HYPERLIPIDEMIA: Primary | ICD-10-CM

## 2020-02-07 DIAGNOSIS — E78.2 MIXED HYPERLIPIDEMIA: ICD-10-CM

## 2020-02-07 LAB
ALBUMIN SERPL BCP-MCNC: 3.3 G/DL (ref 3.5–5)
ALP SERPL-CCNC: 96 U/L (ref 46–116)
ALT SERPL W P-5'-P-CCNC: 24 U/L (ref 12–78)
ANION GAP SERPL CALCULATED.3IONS-SCNC: 7 MMOL/L (ref 4–13)
AST SERPL W P-5'-P-CCNC: 17 U/L (ref 5–45)
BASOPHILS # BLD AUTO: 0.07 THOUSANDS/ΜL (ref 0–0.1)
BASOPHILS NFR BLD AUTO: 1 % (ref 0–1)
BILIRUB SERPL-MCNC: 1.55 MG/DL (ref 0.2–1)
BUN SERPL-MCNC: 18 MG/DL (ref 5–25)
CALCIUM SERPL-MCNC: 9.5 MG/DL (ref 8.3–10.1)
CHLORIDE SERPL-SCNC: 108 MMOL/L (ref 100–108)
CHOLEST SERPL-MCNC: 110 MG/DL (ref 50–200)
CO2 SERPL-SCNC: 25 MMOL/L (ref 21–32)
CREAT SERPL-MCNC: 0.89 MG/DL (ref 0.6–1.3)
EOSINOPHIL # BLD AUTO: 0.5 THOUSAND/ΜL (ref 0–0.61)
EOSINOPHIL NFR BLD AUTO: 6 % (ref 0–6)
ERYTHROCYTE [DISTWIDTH] IN BLOOD BY AUTOMATED COUNT: 13.7 % (ref 11.6–15.1)
FERRITIN SERPL-MCNC: 130 NG/ML (ref 8–388)
GFR SERPL CREATININE-BSD FRML MDRD: 92 ML/MIN/1.73SQ M
GLUCOSE P FAST SERPL-MCNC: 107 MG/DL (ref 65–99)
HCT VFR BLD AUTO: 41 % (ref 36.5–49.3)
HDLC SERPL-MCNC: 37 MG/DL
HGB BLD-MCNC: 13.6 G/DL (ref 12–17)
IMM GRANULOCYTES # BLD AUTO: 0.05 THOUSAND/UL (ref 0–0.2)
IMM GRANULOCYTES NFR BLD AUTO: 1 % (ref 0–2)
LDLC SERPL CALC-MCNC: 55 MG/DL (ref 0–100)
LDLC SERPL DIRECT ASSAY-MCNC: 60 MG/DL (ref 0–100)
LYMPHOCYTES # BLD AUTO: 1.81 THOUSANDS/ΜL (ref 0.6–4.47)
LYMPHOCYTES NFR BLD AUTO: 20 % (ref 14–44)
MCH RBC QN AUTO: 32.6 PG (ref 26.8–34.3)
MCHC RBC AUTO-ENTMCNC: 33.2 G/DL (ref 31.4–37.4)
MCV RBC AUTO: 98 FL (ref 82–98)
MONOCYTES # BLD AUTO: 1.1 THOUSAND/ΜL (ref 0.17–1.22)
MONOCYTES NFR BLD AUTO: 12 % (ref 4–12)
NEUTROPHILS # BLD AUTO: 5.41 THOUSANDS/ΜL (ref 1.85–7.62)
NEUTS SEG NFR BLD AUTO: 60 % (ref 43–75)
NONHDLC SERPL-MCNC: 73 MG/DL
NRBC BLD AUTO-RTO: 0 /100 WBCS
PLATELET # BLD AUTO: 223 THOUSANDS/UL (ref 149–390)
PMV BLD AUTO: 11.4 FL (ref 8.9–12.7)
POTASSIUM SERPL-SCNC: 4.1 MMOL/L (ref 3.5–5.3)
PROT SERPL-MCNC: 7.4 G/DL (ref 6.4–8.2)
RBC # BLD AUTO: 4.17 MILLION/UL (ref 3.88–5.62)
SODIUM SERPL-SCNC: 140 MMOL/L (ref 136–145)
TRIGL SERPL-MCNC: 92 MG/DL
WBC # BLD AUTO: 8.94 THOUSAND/UL (ref 4.31–10.16)

## 2020-02-07 PROCEDURE — 83721 ASSAY OF BLOOD LIPOPROTEIN: CPT

## 2020-02-07 PROCEDURE — 36415 COLL VENOUS BLD VENIPUNCTURE: CPT

## 2020-02-07 PROCEDURE — 80061 LIPID PANEL: CPT

## 2020-02-07 PROCEDURE — 82728 ASSAY OF FERRITIN: CPT

## 2020-02-07 PROCEDURE — 85025 COMPLETE CBC W/AUTO DIFF WBC: CPT

## 2020-02-07 PROCEDURE — 80053 COMPREHEN METABOLIC PANEL: CPT

## 2021-04-19 ENCOUNTER — APPOINTMENT (OUTPATIENT)
Dept: LAB | Age: 61
End: 2021-04-19
Payer: MEDICARE

## 2021-04-19 ENCOUNTER — TRANSCRIBE ORDERS (OUTPATIENT)
Dept: ADMINISTRATIVE | Age: 61
End: 2021-04-19

## 2021-04-19 DIAGNOSIS — I10 ESSENTIAL HYPERTENSION, MALIGNANT: ICD-10-CM

## 2021-04-19 DIAGNOSIS — I63.30 CEREBRAL THROMBOSIS WITH CEREBRAL INFARCTION (HCC): ICD-10-CM

## 2021-04-19 DIAGNOSIS — I25.10 ATHEROSCLEROSIS OF NATIVE CORONARY ARTERY OF NATIVE HEART WITHOUT ANGINA PECTORIS: ICD-10-CM

## 2021-04-19 DIAGNOSIS — I25.10 ATHEROSCLEROSIS OF NATIVE CORONARY ARTERY OF NATIVE HEART WITHOUT ANGINA PECTORIS: Primary | ICD-10-CM

## 2021-04-19 DIAGNOSIS — E78.2 MIXED HYPERLIPIDEMIA: ICD-10-CM

## 2021-04-19 LAB
ALBUMIN SERPL BCP-MCNC: 3.5 G/DL (ref 3.5–5)
ALP SERPL-CCNC: 111 U/L (ref 46–116)
ALT SERPL W P-5'-P-CCNC: 25 U/L (ref 12–78)
ANION GAP SERPL CALCULATED.3IONS-SCNC: 5 MMOL/L (ref 4–13)
AST SERPL W P-5'-P-CCNC: 16 U/L (ref 5–45)
BASOPHILS # BLD AUTO: 0.07 THOUSANDS/ΜL (ref 0–0.1)
BASOPHILS NFR BLD AUTO: 1 % (ref 0–1)
BILIRUB SERPL-MCNC: 1.36 MG/DL (ref 0.2–1)
BUN SERPL-MCNC: 20 MG/DL (ref 5–25)
CALCIUM SERPL-MCNC: 9.6 MG/DL (ref 8.3–10.1)
CHLORIDE SERPL-SCNC: 108 MMOL/L (ref 100–108)
CHOLEST SERPL-MCNC: 103 MG/DL (ref 50–200)
CO2 SERPL-SCNC: 27 MMOL/L (ref 21–32)
CREAT SERPL-MCNC: 1.2 MG/DL (ref 0.6–1.3)
EOSINOPHIL # BLD AUTO: 0.65 THOUSAND/ΜL (ref 0–0.61)
EOSINOPHIL NFR BLD AUTO: 8 % (ref 0–6)
ERYTHROCYTE [DISTWIDTH] IN BLOOD BY AUTOMATED COUNT: 13.9 % (ref 11.6–15.1)
GFR SERPL CREATININE-BSD FRML MDRD: 64 ML/MIN/1.73SQ M
GLUCOSE P FAST SERPL-MCNC: 131 MG/DL (ref 65–99)
HCT VFR BLD AUTO: 46 % (ref 36.5–49.3)
HDLC SERPL-MCNC: 34 MG/DL
HGB BLD-MCNC: 14.9 G/DL (ref 12–17)
IMM GRANULOCYTES # BLD AUTO: 0.04 THOUSAND/UL (ref 0–0.2)
IMM GRANULOCYTES NFR BLD AUTO: 1 % (ref 0–2)
LDLC SERPL CALC-MCNC: 51 MG/DL (ref 0–100)
LDLC SERPL DIRECT ASSAY-MCNC: 64 MG/DL (ref 0–100)
LYMPHOCYTES # BLD AUTO: 1.68 THOUSANDS/ΜL (ref 0.6–4.47)
LYMPHOCYTES NFR BLD AUTO: 20 % (ref 14–44)
MCH RBC QN AUTO: 31.8 PG (ref 26.8–34.3)
MCHC RBC AUTO-ENTMCNC: 32.4 G/DL (ref 31.4–37.4)
MCV RBC AUTO: 98 FL (ref 82–98)
MONOCYTES # BLD AUTO: 0.87 THOUSAND/ΜL (ref 0.17–1.22)
MONOCYTES NFR BLD AUTO: 11 % (ref 4–12)
NEUTROPHILS # BLD AUTO: 4.96 THOUSANDS/ΜL (ref 1.85–7.62)
NEUTS SEG NFR BLD AUTO: 59 % (ref 43–75)
NONHDLC SERPL-MCNC: 69 MG/DL
NRBC BLD AUTO-RTO: 0 /100 WBCS
PLATELET # BLD AUTO: 265 THOUSANDS/UL (ref 149–390)
PMV BLD AUTO: 10.8 FL (ref 8.9–12.7)
POTASSIUM SERPL-SCNC: 4.8 MMOL/L (ref 3.5–5.3)
PROT SERPL-MCNC: 7.8 G/DL (ref 6.4–8.2)
PSA SERPL-MCNC: 1.9 NG/ML (ref 0–4)
RBC # BLD AUTO: 4.69 MILLION/UL (ref 3.88–5.62)
SODIUM SERPL-SCNC: 140 MMOL/L (ref 136–145)
TRIGL SERPL-MCNC: 88 MG/DL
WBC # BLD AUTO: 8.27 THOUSAND/UL (ref 4.31–10.16)

## 2021-04-19 PROCEDURE — 83721 ASSAY OF BLOOD LIPOPROTEIN: CPT

## 2021-04-19 PROCEDURE — 85025 COMPLETE CBC W/AUTO DIFF WBC: CPT

## 2021-04-19 PROCEDURE — 80061 LIPID PANEL: CPT

## 2021-04-19 PROCEDURE — 80053 COMPREHEN METABOLIC PANEL: CPT

## 2021-04-19 PROCEDURE — G0103 PSA SCREENING: HCPCS

## 2021-04-19 PROCEDURE — 36415 COLL VENOUS BLD VENIPUNCTURE: CPT

## 2021-04-26 ENCOUNTER — APPOINTMENT (OUTPATIENT)
Dept: LAB | Age: 61
End: 2021-04-26
Payer: MEDICARE

## 2021-04-26 DIAGNOSIS — I10 ESSENTIAL HYPERTENSION, MALIGNANT: ICD-10-CM

## 2021-04-26 DIAGNOSIS — I63.30 CEREBRAL THROMBOSIS WITH CEREBRAL INFARCTION (HCC): ICD-10-CM

## 2021-04-26 DIAGNOSIS — E78.2 MIXED HYPERLIPIDEMIA: ICD-10-CM

## 2021-04-26 DIAGNOSIS — I25.10 ATHEROSCLEROSIS OF NATIVE CORONARY ARTERY OF NATIVE HEART WITHOUT ANGINA PECTORIS: ICD-10-CM

## 2021-04-26 LAB
ANION GAP SERPL CALCULATED.3IONS-SCNC: 8 MMOL/L (ref 4–13)
BUN SERPL-MCNC: 21 MG/DL (ref 5–25)
CALCIUM SERPL-MCNC: 8.9 MG/DL (ref 8.3–10.1)
CHLORIDE SERPL-SCNC: 108 MMOL/L (ref 100–108)
CO2 SERPL-SCNC: 22 MMOL/L (ref 21–32)
CREAT SERPL-MCNC: 1 MG/DL (ref 0.6–1.3)
EST. AVERAGE GLUCOSE BLD GHB EST-MCNC: 123 MG/DL
GFR SERPL CREATININE-BSD FRML MDRD: 80 ML/MIN/1.73SQ M
GLUCOSE P FAST SERPL-MCNC: 112 MG/DL (ref 65–99)
HBA1C MFR BLD: 5.9 %
POTASSIUM SERPL-SCNC: 4.2 MMOL/L (ref 3.5–5.3)
SODIUM SERPL-SCNC: 138 MMOL/L (ref 136–145)

## 2021-04-26 PROCEDURE — 83036 HEMOGLOBIN GLYCOSYLATED A1C: CPT

## 2021-04-26 PROCEDURE — 80048 BASIC METABOLIC PNL TOTAL CA: CPT

## 2021-04-26 PROCEDURE — 36415 COLL VENOUS BLD VENIPUNCTURE: CPT

## 2022-03-21 ENCOUNTER — APPOINTMENT (OUTPATIENT)
Dept: LAB | Age: 62
End: 2022-03-21
Payer: MEDICARE

## 2022-03-21 DIAGNOSIS — I10 ESSENTIAL HYPERTENSION, MALIGNANT: ICD-10-CM

## 2022-03-21 DIAGNOSIS — I25.10 ATHEROSCLEROSIS OF NATIVE CORONARY ARTERY WITHOUT ANGINA PECTORIS, UNSPECIFIED WHETHER NATIVE OR TRANSPLANTED HEART: ICD-10-CM

## 2022-03-21 DIAGNOSIS — E78.5 HYPERLIPIDEMIA, UNSPECIFIED HYPERLIPIDEMIA TYPE: ICD-10-CM

## 2022-03-21 DIAGNOSIS — R73.01 IMPAIRED FASTING GLUCOSE: ICD-10-CM

## 2022-03-21 LAB
ALBUMIN SERPL BCP-MCNC: 3.4 G/DL (ref 3.5–5)
ALP SERPL-CCNC: 117 U/L (ref 46–116)
ALT SERPL W P-5'-P-CCNC: 22 U/L (ref 12–78)
ANION GAP SERPL CALCULATED.3IONS-SCNC: 7 MMOL/L (ref 4–13)
AST SERPL W P-5'-P-CCNC: 17 U/L (ref 5–45)
BASOPHILS # BLD AUTO: 0.08 THOUSANDS/ΜL (ref 0–0.1)
BASOPHILS NFR BLD AUTO: 1 % (ref 0–1)
BILIRUB SERPL-MCNC: 0.75 MG/DL (ref 0.2–1)
BUN SERPL-MCNC: 16 MG/DL (ref 5–25)
CALCIUM ALBUM COR SERPL-MCNC: 10.1 MG/DL (ref 8.3–10.1)
CALCIUM SERPL-MCNC: 9.6 MG/DL (ref 8.3–10.1)
CHLORIDE SERPL-SCNC: 106 MMOL/L (ref 100–108)
CHOLEST SERPL-MCNC: 124 MG/DL
CO2 SERPL-SCNC: 26 MMOL/L (ref 21–32)
CREAT SERPL-MCNC: 1.07 MG/DL (ref 0.6–1.3)
EOSINOPHIL # BLD AUTO: 0.58 THOUSAND/ΜL (ref 0–0.61)
EOSINOPHIL NFR BLD AUTO: 7 % (ref 0–6)
ERYTHROCYTE [DISTWIDTH] IN BLOOD BY AUTOMATED COUNT: 14.2 % (ref 11.6–15.1)
EST. AVERAGE GLUCOSE BLD GHB EST-MCNC: 126 MG/DL
GFR SERPL CREATININE-BSD FRML MDRD: 73 ML/MIN/1.73SQ M
GLUCOSE P FAST SERPL-MCNC: 116 MG/DL (ref 65–99)
HBA1C MFR BLD: 6 %
HCT VFR BLD AUTO: 41.7 % (ref 36.5–49.3)
HDLC SERPL-MCNC: 33 MG/DL
HGB BLD-MCNC: 14.3 G/DL (ref 12–17)
IMM GRANULOCYTES # BLD AUTO: 0.03 THOUSAND/UL (ref 0–0.2)
IMM GRANULOCYTES NFR BLD AUTO: 0 % (ref 0–2)
LDLC SERPL CALC-MCNC: 74 MG/DL (ref 0–100)
LYMPHOCYTES # BLD AUTO: 1.69 THOUSANDS/ΜL (ref 0.6–4.47)
LYMPHOCYTES NFR BLD AUTO: 20 % (ref 14–44)
MCH RBC QN AUTO: 31.5 PG (ref 26.8–34.3)
MCHC RBC AUTO-ENTMCNC: 34.3 G/DL (ref 31.4–37.4)
MCV RBC AUTO: 92 FL (ref 82–98)
MONOCYTES # BLD AUTO: 0.93 THOUSAND/ΜL (ref 0.17–1.22)
MONOCYTES NFR BLD AUTO: 11 % (ref 4–12)
NEUTROPHILS # BLD AUTO: 5.15 THOUSANDS/ΜL (ref 1.85–7.62)
NEUTS SEG NFR BLD AUTO: 61 % (ref 43–75)
NONHDLC SERPL-MCNC: 91 MG/DL
NRBC BLD AUTO-RTO: 0 /100 WBCS
PLATELET # BLD AUTO: 260 THOUSANDS/UL (ref 149–390)
PMV BLD AUTO: 10.8 FL (ref 8.9–12.7)
POTASSIUM SERPL-SCNC: 4.2 MMOL/L (ref 3.5–5.3)
PROT SERPL-MCNC: 7.7 G/DL (ref 6.4–8.2)
RBC # BLD AUTO: 4.54 MILLION/UL (ref 3.88–5.62)
SODIUM SERPL-SCNC: 139 MMOL/L (ref 136–145)
TRIGL SERPL-MCNC: 86 MG/DL
WBC # BLD AUTO: 8.46 THOUSAND/UL (ref 4.31–10.16)

## 2022-03-21 PROCEDURE — 83036 HEMOGLOBIN GLYCOSYLATED A1C: CPT

## 2022-03-21 PROCEDURE — 85025 COMPLETE CBC W/AUTO DIFF WBC: CPT

## 2022-03-21 PROCEDURE — 36415 COLL VENOUS BLD VENIPUNCTURE: CPT

## 2022-03-21 PROCEDURE — 80053 COMPREHEN METABOLIC PANEL: CPT

## 2022-03-21 PROCEDURE — 80061 LIPID PANEL: CPT

## 2023-03-24 ENCOUNTER — APPOINTMENT (OUTPATIENT)
Dept: LAB | Age: 63
End: 2023-03-24

## 2023-03-24 DIAGNOSIS — R73.01 IMPAIRED FASTING GLUCOSE: ICD-10-CM

## 2023-03-24 DIAGNOSIS — Z00.00 ROUTINE GENERAL MEDICAL EXAMINATION AT A HEALTH CARE FACILITY: ICD-10-CM

## 2023-03-24 DIAGNOSIS — Z80.42 FAMILY HISTORY OF MALIGNANT NEOPLASM OF PROSTATE: ICD-10-CM

## 2023-03-24 LAB
ALBUMIN SERPL BCP-MCNC: 3.6 G/DL (ref 3.5–5)
ALP SERPL-CCNC: 95 U/L (ref 46–116)
ALT SERPL W P-5'-P-CCNC: 24 U/L (ref 12–78)
ANION GAP SERPL CALCULATED.3IONS-SCNC: 5 MMOL/L (ref 4–13)
AST SERPL W P-5'-P-CCNC: 18 U/L (ref 5–45)
BACTERIA UR QL AUTO: ABNORMAL /HPF
BASOPHILS # BLD AUTO: 0.09 THOUSANDS/ÂΜL (ref 0–0.1)
BASOPHILS NFR BLD AUTO: 1 % (ref 0–1)
BILIRUB SERPL-MCNC: 0.9 MG/DL (ref 0.2–1)
BILIRUB UR QL STRIP: NEGATIVE
BUN SERPL-MCNC: 19 MG/DL (ref 5–25)
CALCIUM SERPL-MCNC: 9.6 MG/DL (ref 8.3–10.1)
CHLORIDE SERPL-SCNC: 108 MMOL/L (ref 96–108)
CHOLEST SERPL-MCNC: 124 MG/DL
CLARITY UR: CLEAR
CO2 SERPL-SCNC: 24 MMOL/L (ref 21–32)
COLOR UR: YELLOW
CREAT SERPL-MCNC: 0.98 MG/DL (ref 0.6–1.3)
EOSINOPHIL # BLD AUTO: 0.5 THOUSAND/ÂΜL (ref 0–0.61)
EOSINOPHIL NFR BLD AUTO: 5 % (ref 0–6)
ERYTHROCYTE [DISTWIDTH] IN BLOOD BY AUTOMATED COUNT: 14.4 % (ref 11.6–15.1)
EST. AVERAGE GLUCOSE BLD GHB EST-MCNC: 123 MG/DL
GFR SERPL CREATININE-BSD FRML MDRD: 81 ML/MIN/1.73SQ M
GLUCOSE P FAST SERPL-MCNC: 112 MG/DL (ref 65–99)
GLUCOSE UR STRIP-MCNC: NEGATIVE MG/DL
HBA1C MFR BLD: 5.9 %
HCT VFR BLD AUTO: 45.3 % (ref 36.5–49.3)
HDLC SERPL-MCNC: 39 MG/DL
HGB BLD-MCNC: 14.5 G/DL (ref 12–17)
HGB UR QL STRIP.AUTO: NEGATIVE
IMM GRANULOCYTES # BLD AUTO: 0.04 THOUSAND/UL (ref 0–0.2)
IMM GRANULOCYTES NFR BLD AUTO: 0 % (ref 0–2)
KETONES UR STRIP-MCNC: NEGATIVE MG/DL
LDLC SERPL CALC-MCNC: 72 MG/DL (ref 0–100)
LEUKOCYTE ESTERASE UR QL STRIP: NEGATIVE
LYMPHOCYTES # BLD AUTO: 1.49 THOUSANDS/ÂΜL (ref 0.6–4.47)
LYMPHOCYTES NFR BLD AUTO: 16 % (ref 14–44)
MCH RBC QN AUTO: 31.2 PG (ref 26.8–34.3)
MCHC RBC AUTO-ENTMCNC: 32 G/DL (ref 31.4–37.4)
MCV RBC AUTO: 97 FL (ref 82–98)
MONOCYTES # BLD AUTO: 0.94 THOUSAND/ÂΜL (ref 0.17–1.22)
MONOCYTES NFR BLD AUTO: 10 % (ref 4–12)
MUCOUS THREADS UR QL AUTO: ABNORMAL
NEUTROPHILS # BLD AUTO: 6.41 THOUSANDS/ÂΜL (ref 1.85–7.62)
NEUTS SEG NFR BLD AUTO: 68 % (ref 43–75)
NITRITE UR QL STRIP: NEGATIVE
NON-SQ EPI CELLS URNS QL MICRO: ABNORMAL /HPF
NONHDLC SERPL-MCNC: 85 MG/DL
NRBC BLD AUTO-RTO: 0 /100 WBCS
PH UR STRIP.AUTO: 5.5 [PH]
PLATELET # BLD AUTO: 296 THOUSANDS/UL (ref 149–390)
PMV BLD AUTO: 10.4 FL (ref 8.9–12.7)
POTASSIUM SERPL-SCNC: 4.2 MMOL/L (ref 3.5–5.3)
PROT SERPL-MCNC: 7.9 G/DL (ref 6.4–8.4)
PROT UR STRIP-MCNC: ABNORMAL MG/DL
PSA SERPL-MCNC: 1.5 NG/ML (ref 0–4)
RBC # BLD AUTO: 4.65 MILLION/UL (ref 3.88–5.62)
RBC #/AREA URNS AUTO: ABNORMAL /HPF
SODIUM SERPL-SCNC: 137 MMOL/L (ref 135–147)
SP GR UR STRIP.AUTO: 1.03 (ref 1–1.03)
TRIGL SERPL-MCNC: 64 MG/DL
UROBILINOGEN UR STRIP-ACNC: <2 MG/DL
WBC # BLD AUTO: 9.47 THOUSAND/UL (ref 4.31–10.16)
WBC #/AREA URNS AUTO: ABNORMAL /HPF

## 2023-12-07 NOTE — PROGRESS NOTES
December 7, 2023      [facility]  [address]  [facility] [address]  Phone: [phone]  Fax: [phone]       Patient: Aldo Aguilar   YOB: 1982  Date of Visit: 12/07/2023    To Whom It May Concern:    Deepika Aguilar  was at [facility] on 12/07/2023. The patient may return to work/school on 12/08/23 with no restrictions. If you have any questions or concerns, or if I can be of further assistance, please do not hesitate to contact me.    Sincerely,  Electronically signed by Simone Egan MD   CC:    Gisselle Shabazz MA      59 y o male presenting to the office for follow-up on right hip pain  Patient got a corticosteroid injection to the right hip on 1/23/18 and has been doing physical therapy exercises regularly  Patient states that the corticosteroid injection significantly helped  He states that he no longer has groin pain  Patient states that he does have some musculature pain which improved with physical therapy exercises and heat pads  Patient is able to ambulate as much as he wants and can get through his regular daily activities  He still continues to have some difficulty going up stairs  Patient states that this pain does not bother him at night  He denies any other acute or associated complaints  Review of Systems  Review of systems negative unless otherwise specified in HPI    Past Medical History  No past medical history on file  Past Surgical History  No past surgical history on file  Current Medications  No current outpatient prescriptions on file prior to visit  No current facility-administered medications on file prior to visit  Recent Labs [facility])    0  Lab Value Date/Time   HCT 42 2 11/22/2017 0636   HCT 41 7 10/15/2015 0941   HGB 14 3 11/22/2017 0636   HGB 14 1 10/15/2015 0941   WBC 8 17 11/22/2017 0636   WBC 8 77 10/15/2015 0941   GLUCOSE 127 10/10/2016 0653   GLUCOSE 88 10/15/2015 0941   HGBA1C 6 0 11/22/2017 0636   HGBA1C 6 2 (H) 04/24/2015 0714       Physical exam  · General: Awake, Alert, Oriented  · Eyes: Pupils equal, round and reactive to light  · Heart: regular rate and rhythm  · Lungs: No audible wheezing  · Abdomen: soft  right Lower extremity  · Nontender to palpation of right hip  · Patient has limited internal rotation of hip, however, he has normal and nonpainful flexion, abduction and abduction, and external rotation    · Patient has 5/5 strength with flexion of the hip  · Sensation intact      Imaging  None needed today    Procedure  None    Assessment/Plan:   59 y o male with osteoarthritis of the right hip  Patient to continue full activities as tolerated  He can continue with corticosteroid injections Q 3 months as needed  Patient continue with physical therapy exercises and he pads as needed  Patient follow-up in 3 months for repeat evaluation  Patient can get his next corticosteroid injection in April  He will call when the pain returns and he wants another injection

## 2024-04-04 ENCOUNTER — APPOINTMENT (OUTPATIENT)
Dept: LAB | Age: 64
End: 2024-04-04
Payer: MEDICARE

## 2024-04-04 DIAGNOSIS — I10 ESSENTIAL HYPERTENSION, MALIGNANT: ICD-10-CM

## 2024-04-04 DIAGNOSIS — Z00.01 ENCOUNTER FOR GENERAL ADULT MEDICAL EXAMINATION WITH ABNORMAL FINDINGS: ICD-10-CM

## 2024-04-04 DIAGNOSIS — R73.01 IMPAIRED FASTING GLUCOSE: ICD-10-CM

## 2024-04-04 LAB
ALBUMIN SERPL BCP-MCNC: 3.8 G/DL (ref 3.5–5)
ALP SERPL-CCNC: 103 U/L (ref 34–104)
ALT SERPL W P-5'-P-CCNC: 13 U/L (ref 7–52)
AMORPH URATE CRY URNS QL MICRO: ABNORMAL
ANION GAP SERPL CALCULATED.3IONS-SCNC: 8 MMOL/L (ref 4–13)
AST SERPL W P-5'-P-CCNC: 14 U/L (ref 13–39)
BACTERIA UR QL AUTO: ABNORMAL /HPF
BASOPHILS # BLD AUTO: 0.07 THOUSANDS/ÂΜL (ref 0–0.1)
BASOPHILS NFR BLD AUTO: 1 % (ref 0–1)
BILIRUB SERPL-MCNC: 0.97 MG/DL (ref 0.2–1)
BILIRUB UR QL STRIP: NEGATIVE
BUN SERPL-MCNC: 18 MG/DL (ref 5–25)
CALCIUM SERPL-MCNC: 9.5 MG/DL (ref 8.4–10.2)
CHLORIDE SERPL-SCNC: 103 MMOL/L (ref 96–108)
CHOLEST SERPL-MCNC: 114 MG/DL
CLARITY UR: CLEAR
CO2 SERPL-SCNC: 28 MMOL/L (ref 21–32)
COLOR UR: YELLOW
CREAT SERPL-MCNC: 0.97 MG/DL (ref 0.6–1.3)
EOSINOPHIL # BLD AUTO: 0.35 THOUSAND/ÂΜL (ref 0–0.61)
EOSINOPHIL NFR BLD AUTO: 4 % (ref 0–6)
ERYTHROCYTE [DISTWIDTH] IN BLOOD BY AUTOMATED COUNT: 14.1 % (ref 11.6–15.1)
EST. AVERAGE GLUCOSE BLD GHB EST-MCNC: 137 MG/DL
GFR SERPL CREATININE-BSD FRML MDRD: 81 ML/MIN/1.73SQ M
GLUCOSE P FAST SERPL-MCNC: 115 MG/DL (ref 65–99)
GLUCOSE UR STRIP-MCNC: NEGATIVE MG/DL
HBA1C MFR BLD: 6.4 %
HCT VFR BLD AUTO: 45 % (ref 36.5–49.3)
HDLC SERPL-MCNC: 32 MG/DL
HGB BLD-MCNC: 14.7 G/DL (ref 12–17)
HGB UR QL STRIP.AUTO: NEGATIVE
IMM GRANULOCYTES # BLD AUTO: 0.03 THOUSAND/UL (ref 0–0.2)
IMM GRANULOCYTES NFR BLD AUTO: 0 % (ref 0–2)
KETONES UR STRIP-MCNC: NEGATIVE MG/DL
LDLC SERPL CALC-MCNC: 61 MG/DL (ref 0–100)
LEUKOCYTE ESTERASE UR QL STRIP: NEGATIVE
LYMPHOCYTES # BLD AUTO: 1.5 THOUSANDS/ÂΜL (ref 0.6–4.47)
LYMPHOCYTES NFR BLD AUTO: 17 % (ref 14–44)
MCH RBC QN AUTO: 31.9 PG (ref 26.8–34.3)
MCHC RBC AUTO-ENTMCNC: 32.7 G/DL (ref 31.4–37.4)
MCV RBC AUTO: 98 FL (ref 82–98)
MONOCYTES # BLD AUTO: 0.91 THOUSAND/ÂΜL (ref 0.17–1.22)
MONOCYTES NFR BLD AUTO: 10 % (ref 4–12)
MUCOUS THREADS UR QL AUTO: ABNORMAL
NEUTROPHILS # BLD AUTO: 6.07 THOUSANDS/ÂΜL (ref 1.85–7.62)
NEUTS SEG NFR BLD AUTO: 68 % (ref 43–75)
NITRITE UR QL STRIP: NEGATIVE
NON-SQ EPI CELLS URNS QL MICRO: ABNORMAL /HPF
NONHDLC SERPL-MCNC: 82 MG/DL
NRBC BLD AUTO-RTO: 0 /100 WBCS
PH UR STRIP.AUTO: 5.5 [PH]
PLATELET # BLD AUTO: 285 THOUSANDS/UL (ref 149–390)
PMV BLD AUTO: 10.9 FL (ref 8.9–12.7)
POTASSIUM SERPL-SCNC: 4.6 MMOL/L (ref 3.5–5.3)
PROT SERPL-MCNC: 7.4 G/DL (ref 6.4–8.4)
PROT UR STRIP-MCNC: ABNORMAL MG/DL
PSA SERPL-MCNC: 1.38 NG/ML (ref 0–4)
RBC # BLD AUTO: 4.61 MILLION/UL (ref 3.88–5.62)
RBC #/AREA URNS AUTO: ABNORMAL /HPF
SODIUM SERPL-SCNC: 139 MMOL/L (ref 135–147)
SP GR UR STRIP.AUTO: 1.02 (ref 1–1.03)
TRIGL SERPL-MCNC: 105 MG/DL
UROBILINOGEN UR STRIP-ACNC: <2 MG/DL
WBC # BLD AUTO: 8.93 THOUSAND/UL (ref 4.31–10.16)
WBC #/AREA URNS AUTO: ABNORMAL /HPF

## 2024-04-04 PROCEDURE — 81001 URINALYSIS AUTO W/SCOPE: CPT

## 2024-04-04 PROCEDURE — 83036 HEMOGLOBIN GLYCOSYLATED A1C: CPT

## 2024-04-04 PROCEDURE — 80053 COMPREHEN METABOLIC PANEL: CPT

## 2024-04-04 PROCEDURE — 84153 ASSAY OF PSA TOTAL: CPT

## 2024-04-04 PROCEDURE — 85025 COMPLETE CBC W/AUTO DIFF WBC: CPT

## 2024-04-04 PROCEDURE — 36415 COLL VENOUS BLD VENIPUNCTURE: CPT

## 2024-04-04 PROCEDURE — 80061 LIPID PANEL: CPT

## 2024-10-30 ENCOUNTER — APPOINTMENT (OUTPATIENT)
Dept: LAB | Age: 64
End: 2024-10-30
Payer: MEDICARE

## 2024-10-30 DIAGNOSIS — E78.49 OTHER HYPERLIPIDEMIA: ICD-10-CM

## 2024-10-30 DIAGNOSIS — I10 ESSENTIAL HYPERTENSION, MALIGNANT: ICD-10-CM

## 2024-10-30 DIAGNOSIS — R73.01 IMPAIRED FASTING GLUCOSE: ICD-10-CM

## 2024-10-30 LAB
ANION GAP SERPL CALCULATED.3IONS-SCNC: 7 MMOL/L (ref 4–13)
BACTERIA UR QL AUTO: ABNORMAL /HPF
BILIRUB UR QL STRIP: NEGATIVE
BUN SERPL-MCNC: 15 MG/DL (ref 5–25)
CALCIUM SERPL-MCNC: 9.7 MG/DL (ref 8.4–10.2)
CHLORIDE SERPL-SCNC: 100 MMOL/L (ref 96–108)
CLARITY UR: CLEAR
CO2 SERPL-SCNC: 28 MMOL/L (ref 21–32)
COLOR UR: YELLOW
CREAT SERPL-MCNC: 0.88 MG/DL (ref 0.6–1.3)
EST. AVERAGE GLUCOSE BLD GHB EST-MCNC: 134 MG/DL
GFR SERPL CREATININE-BSD FRML MDRD: 89 ML/MIN/1.73SQ M
GLUCOSE P FAST SERPL-MCNC: 100 MG/DL (ref 65–99)
GLUCOSE UR STRIP-MCNC: NEGATIVE MG/DL
HBA1C MFR BLD: 6.3 %
HGB UR QL STRIP.AUTO: NEGATIVE
HYALINE CASTS #/AREA URNS LPF: ABNORMAL /LPF
KETONES UR STRIP-MCNC: NEGATIVE MG/DL
LEUKOCYTE ESTERASE UR QL STRIP: NEGATIVE
MUCOUS THREADS UR QL AUTO: ABNORMAL
NITRITE UR QL STRIP: NEGATIVE
NON-SQ EPI CELLS URNS QL MICRO: ABNORMAL /HPF
PH UR STRIP.AUTO: 5.5 [PH]
POTASSIUM SERPL-SCNC: 4.4 MMOL/L (ref 3.5–5.3)
PROT UR STRIP-MCNC: ABNORMAL MG/DL
RBC #/AREA URNS AUTO: ABNORMAL /HPF
SODIUM SERPL-SCNC: 135 MMOL/L (ref 135–147)
SP GR UR STRIP.AUTO: 1.02 (ref 1–1.03)
UROBILINOGEN UR STRIP-ACNC: <2 MG/DL
WBC #/AREA URNS AUTO: ABNORMAL /HPF

## 2024-10-30 PROCEDURE — 81001 URINALYSIS AUTO W/SCOPE: CPT

## 2024-10-30 PROCEDURE — 80048 BASIC METABOLIC PNL TOTAL CA: CPT

## 2024-10-30 PROCEDURE — 36415 COLL VENOUS BLD VENIPUNCTURE: CPT

## 2024-10-30 PROCEDURE — 83036 HEMOGLOBIN GLYCOSYLATED A1C: CPT
